# Patient Record
Sex: FEMALE | Race: WHITE | NOT HISPANIC OR LATINO | Employment: FULL TIME | URBAN - METROPOLITAN AREA
[De-identification: names, ages, dates, MRNs, and addresses within clinical notes are randomized per-mention and may not be internally consistent; named-entity substitution may affect disease eponyms.]

---

## 2019-03-19 ENCOUNTER — TELEPHONE (OUTPATIENT)
Dept: SCHEDULING | Facility: IMAGING CENTER | Age: 24
End: 2019-03-19

## 2019-05-03 ENCOUNTER — OFFICE VISIT (OUTPATIENT)
Dept: INTERNAL MEDICINE | Facility: MEDICAL CENTER | Age: 24
End: 2019-05-03
Payer: COMMERCIAL

## 2019-05-03 VITALS
HEIGHT: 64 IN | WEIGHT: 142.6 LBS | SYSTOLIC BLOOD PRESSURE: 98 MMHG | TEMPERATURE: 97.7 F | DIASTOLIC BLOOD PRESSURE: 60 MMHG | BODY MASS INDEX: 24.34 KG/M2 | HEART RATE: 97 BPM | RESPIRATION RATE: 17 BRPM | OXYGEN SATURATION: 96 %

## 2019-05-03 DIAGNOSIS — Z13.0 SCREENING FOR ENDOCRINE, METABOLIC AND IMMUNITY DISORDER: ICD-10-CM

## 2019-05-03 DIAGNOSIS — Z13.228 SCREENING FOR ENDOCRINE, METABOLIC AND IMMUNITY DISORDER: ICD-10-CM

## 2019-05-03 DIAGNOSIS — G43.009 MIGRAINE WITHOUT AURA AND WITHOUT STATUS MIGRAINOSUS, NOT INTRACTABLE: ICD-10-CM

## 2019-05-03 DIAGNOSIS — Z00.00 ROUTINE GENERAL MEDICAL EXAMINATION AT A HEALTH CARE FACILITY: ICD-10-CM

## 2019-05-03 DIAGNOSIS — Z72.51 HIGH RISK HETEROSEXUAL BEHAVIOR: ICD-10-CM

## 2019-05-03 DIAGNOSIS — Z13.29 SCREENING FOR ENDOCRINE, METABOLIC AND IMMUNITY DISORDER: ICD-10-CM

## 2019-05-03 PROCEDURE — 99204 OFFICE O/P NEW MOD 45 MIN: CPT | Mod: GC | Performed by: INTERNAL MEDICINE

## 2019-05-03 ASSESSMENT — PATIENT HEALTH QUESTIONNAIRE - PHQ9: CLINICAL INTERPRETATION OF PHQ2 SCORE: 0

## 2019-05-03 ASSESSMENT — ENCOUNTER SYMPTOMS
MYALGIAS: 0
DIZZINESS: 0
BACK PAIN: 0
DEPRESSION: 0
NERVOUS/ANXIOUS: 0
PALPITATIONS: 0
COUGH: 0
DOUBLE VISION: 0
CHILLS: 0
TINGLING: 0
BRUISES/BLEEDS EASILY: 0
FEVER: 0
BLURRED VISION: 0
SHORTNESS OF BREATH: 0
TREMORS: 0
SENSORY CHANGE: 0

## 2019-05-03 ASSESSMENT — PAIN SCALES - GENERAL: PAINLEVEL: NO PAIN

## 2019-05-03 NOTE — PROGRESS NOTES
Establishment of Care    CC: To establish care   Chief Complaint   Patient presents with   • New Patient     migraine       HPI: Patient is a 24 y.o. female here to establish care.  She has a history of migraine headaches which is being managed by neurology, no other current complaints.    Migraine headaches: Patient has had migraine headaches for years.  She describes her migraines as usually left-sided, sharp in quality, accompanied by blurry vision and black spots in her vision, nausea with vomiting, photosensitivity and sound sensitivity.  She finds relief with laying in a dark room and sleeping.  They can last anywhere from a few hours to a full day.  She is being managed by neurology who has had her on multiple medications with failures, but most recently she was started on Ajovy which she has noted an improvement from occurring about 5 times per month to 2 times monthly.  She is also on sumatriptan for abortive therapy.  Cannot identify any triggers.  No relation to menstrual cycle.  No previous history of head injury, MRIs done in the past have been negative.    ROS:  Review of Systems   Constitutional: Negative for chills and fever.   HENT: Negative for ear pain, hearing loss and tinnitus.    Eyes: Negative for blurred vision and double vision.   Respiratory: Negative for cough and shortness of breath.    Cardiovascular: Negative for chest pain and palpitations.   Genitourinary: Negative for dysuria and urgency.   Musculoskeletal: Negative for back pain and myalgias.   Skin: Negative for itching and rash.   Neurological: Negative for dizziness, tingling, tremors and sensory change.   Endo/Heme/Allergies: Negative for environmental allergies. Does not bruise/bleed easily.   Psychiatric/Behavioral: Negative for depression. The patient is not nervous/anxious.      Patient Active Problem List    Diagnosis Date Noted   • Routine general medical examination at a health care facility  "05/03/2019   • Screening for endocrine, metabolic and immunity disorder 05/03/2019   • Migraine without aura and without status migrainosus, not intractable 05/03/2019   • High risk heterosexual behavior 05/03/2019     Past Medical History:   Diagnosis Date   • OM (otitis media)      Current Outpatient Prescriptions   Medication Sig Dispense Refill   • Fremanezumab-vfrm (AJOVY) 225 MG/1.5ML Solution Prefilled Syringe Inject 225 mg as instructed Once.     • norgestrel-ethinyl estradiol (LO-OVRAL) 0.3-30 MG-MCG TABS Take 1 Tab by mouth every day.         No current facility-administered medications for this visit.      Allergies as of 05/03/2019   • (No Known Allergies)     Social History     Social History   • Marital status: Single     Spouse name: N/A   • Number of children: N/A   • Years of education: N/A     Occupational History   • Not on file.     Social History Main Topics   • Smoking status: Never Smoker   • Smokeless tobacco: Never Used   • Alcohol use No   • Drug use: No   • Sexual activity: Not on file     Other Topics Concern   • Not on file     Social History Narrative   • No narrative on file     Family History   Problem Relation Age of Onset   • Breast Cancer Maternal Grandmother 40   • Breast Cancer Paternal Grandmother 40   • Ovarian Cancer Paternal Aunt 42     Past Surgical History:   Procedure Laterality Date   • TYMPANOPLASTY  1/18/2011    Performed by TAVO MATTHEW at SURGERY SAME DAY [facility] ORS   • MYRINGOTOMY  1997    [facility]     Physical Exam:  BP (!) 98/60 (BP Location: Left arm, Patient Position: Sitting, BP Cuff Size: Adult long)   Pulse 97   Temp 36.5 °C (97.7 °F) (Temporal)   Resp 17   Ht 1.63 m (5' 4.17\")   Wt 64.7 kg (142 lb 9.6 oz)   LMP 04/28/2019 (Approximate)   SpO2 96%   Breastfeeding? No   BMI 24.35 kg/m²   Physical Exam   Constitutional: She is oriented to person, place, and time. She appears well-developed and well-nourished.   HENT:   Head: Normocephalic and " atraumatic.   Mouth/Throat: Oropharynx is clear and moist.   Eyes: Conjunctivae and EOM are normal.   Neck: Normal range of motion.   Cardiovascular: Normal rate, regular rhythm, normal heart sounds and intact distal pulses.  Exam reveals no friction rub.    No murmur heard.  Pulmonary/Chest: Effort normal and breath sounds normal. No respiratory distress. She has no wheezes. She has no rales.   Abdominal: Soft. She exhibits no distension. There is no tenderness.   Musculoskeletal: Normal range of motion. She exhibits no edema.   Lymphadenopathy:     She has no cervical adenopathy.   Neurological: She is alert and oriented to person, place, and time.   Skin: Skin is warm and dry. Capillary refill takes less than 2 seconds.   Psychiatric: She has a normal mood and affect. Her behavior is normal.   Vitals reviewed.    Note: I have reviewed all pertinent labs and diagnostic tests associated with this visit with specific comments listed under the assessment and plan below    Assessment and Plan:    1. Migraine without aura and without status migrainosus, not intractable  -Avoid identifiable triggers   -Continue Anjovy for prevention, sumatriptan for abortive therapy   -Continue follow up with neurology     2. Routine general medical examination at a health care facility  3. Screening for endocrine, metabolic and immunity disorder  -For CBC, CMP, TSH     #Health maintenance:   -Patient would like for STD screening, asymptomatic  -Pap smear done in 2017, negative per patient.  Due 2020 per patient's previously established gynecologist.  -Immunizations reviewed through [employer] up-to-date currently    Followup: Return in about 1 year (around 5/3/2020), or if symptoms worsen or fail to improve.    Signed by: Chitra Espinal M.D.

## 2019-05-17 ENCOUNTER — HOSPITAL ENCOUNTER (OUTPATIENT)
Dept: LAB | Facility: MEDICAL CENTER | Age: 24
End: 2019-05-17
Attending: STUDENT IN AN ORGANIZED HEALTH CARE EDUCATION/TRAINING PROGRAM
Payer: COMMERCIAL

## 2019-05-17 DIAGNOSIS — Z72.51 HIGH RISK HETEROSEXUAL BEHAVIOR: ICD-10-CM

## 2019-05-17 DIAGNOSIS — Z00.00 ROUTINE GENERAL MEDICAL EXAMINATION AT A HEALTH CARE FACILITY: ICD-10-CM

## 2019-05-17 DIAGNOSIS — Z13.228 SCREENING FOR ENDOCRINE, METABOLIC AND IMMUNITY DISORDER: ICD-10-CM

## 2019-05-17 DIAGNOSIS — Z13.0 SCREENING FOR ENDOCRINE, METABOLIC AND IMMUNITY DISORDER: ICD-10-CM

## 2019-05-17 DIAGNOSIS — Z13.29 SCREENING FOR ENDOCRINE, METABOLIC AND IMMUNITY DISORDER: ICD-10-CM

## 2019-05-17 LAB
ALBUMIN SERPL BCP-MCNC: 4.5 G/DL (ref 3.2–4.9)
ALBUMIN/GLOB SERPL: 1.5 G/DL
ALP SERPL-CCNC: 44 U/L (ref 30–99)
ALT SERPL-CCNC: 12 U/L (ref 2–50)
ANION GAP SERPL CALC-SCNC: 6 MMOL/L (ref 0–11.9)
AST SERPL-CCNC: 14 U/L (ref 12–45)
BASOPHILS # BLD AUTO: 0.7 % (ref 0–1.8)
BASOPHILS # BLD: 0.05 K/UL (ref 0–0.12)
BILIRUB SERPL-MCNC: 0.7 MG/DL (ref 0.1–1.5)
BUN SERPL-MCNC: 16 MG/DL (ref 8–22)
CALCIUM SERPL-MCNC: 9.2 MG/DL (ref 8.5–10.5)
CHLORIDE SERPL-SCNC: 107 MMOL/L (ref 96–112)
CO2 SERPL-SCNC: 25 MMOL/L (ref 20–33)
CREAT SERPL-MCNC: 0.63 MG/DL (ref 0.5–1.4)
EOSINOPHIL # BLD AUTO: 0.09 K/UL (ref 0–0.51)
EOSINOPHIL NFR BLD: 1.2 % (ref 0–6.9)
ERYTHROCYTE [DISTWIDTH] IN BLOOD BY AUTOMATED COUNT: 42.7 FL (ref 35.9–50)
GLOBULIN SER CALC-MCNC: 3.1 G/DL (ref 1.9–3.5)
GLUCOSE SERPL-MCNC: 81 MG/DL (ref 65–99)
HCT VFR BLD AUTO: 43.3 % (ref 37–47)
HGB BLD-MCNC: 13.9 G/DL (ref 12–16)
HIV 1+2 AB+HIV1 P24 AG SERPL QL IA: NON REACTIVE
IMM GRANULOCYTES # BLD AUTO: 0.02 K/UL (ref 0–0.11)
IMM GRANULOCYTES NFR BLD AUTO: 0.3 % (ref 0–0.9)
LYMPHOCYTES # BLD AUTO: 2.57 K/UL (ref 1–4.8)
LYMPHOCYTES NFR BLD: 35.3 % (ref 22–41)
MCH RBC QN AUTO: 29.7 PG (ref 27–33)
MCHC RBC AUTO-ENTMCNC: 32.1 G/DL (ref 33.6–35)
MCV RBC AUTO: 92.5 FL (ref 81.4–97.8)
MONOCYTES # BLD AUTO: 0.49 K/UL (ref 0–0.85)
MONOCYTES NFR BLD AUTO: 6.7 % (ref 0–13.4)
NEUTROPHILS # BLD AUTO: 4.06 K/UL (ref 2–7.15)
NEUTROPHILS NFR BLD: 55.8 % (ref 44–72)
NRBC # BLD AUTO: 0 K/UL
NRBC BLD-RTO: 0 /100 WBC
PLATELET # BLD AUTO: 321 K/UL (ref 164–446)
PMV BLD AUTO: 8.7 FL (ref 9–12.9)
POTASSIUM SERPL-SCNC: 3.9 MMOL/L (ref 3.6–5.5)
PROT SERPL-MCNC: 7.6 G/DL (ref 6–8.2)
RBC # BLD AUTO: 4.68 M/UL (ref 4.2–5.4)
SODIUM SERPL-SCNC: 138 MMOL/L (ref 135–145)
TREPONEMA PALLIDUM IGG+IGM AB [PRESENCE] IN SERUM OR PLASMA BY IMMUNOASSAY: NON REACTIVE
TSH SERPL DL<=0.005 MIU/L-ACNC: 1.18 UIU/ML (ref 0.38–5.33)
WBC # BLD AUTO: 7.3 K/UL (ref 4.8–10.8)

## 2019-05-17 PROCEDURE — 87491 CHLMYD TRACH DNA AMP PROBE: CPT

## 2019-05-17 PROCEDURE — 84443 ASSAY THYROID STIM HORMONE: CPT

## 2019-05-17 PROCEDURE — 87389 HIV-1 AG W/HIV-1&-2 AB AG IA: CPT

## 2019-05-17 PROCEDURE — 80053 COMPREHEN METABOLIC PANEL: CPT

## 2019-05-17 PROCEDURE — 86780 TREPONEMA PALLIDUM: CPT

## 2019-05-17 PROCEDURE — 85025 COMPLETE CBC W/AUTO DIFF WBC: CPT

## 2019-05-17 PROCEDURE — 36415 COLL VENOUS BLD VENIPUNCTURE: CPT

## 2019-05-17 PROCEDURE — 87591 N.GONORRHOEAE DNA AMP PROB: CPT

## 2019-05-18 LAB
C TRACH DNA SPEC QL NAA+PROBE: NEGATIVE
N GONORRHOEA DNA SPEC QL NAA+PROBE: NEGATIVE
SPECIMEN SOURCE: NORMAL

## 2022-06-09 ENCOUNTER — HOSPITAL ENCOUNTER (OUTPATIENT)
Dept: LAB | Facility: MEDICAL CENTER | Age: 27
End: 2022-06-09
Attending: OBSTETRICS & GYNECOLOGY
Payer: COMMERCIAL

## 2022-06-09 PROCEDURE — 88175 CYTOPATH C/V AUTO FLUID REDO: CPT

## 2022-06-10 LAB — CYTOLOGY REG CYTOL: NORMAL

## 2023-01-13 ENCOUNTER — HOSPITAL ENCOUNTER (OUTPATIENT)
Dept: LAB | Facility: MEDICAL CENTER | Age: 28
End: 2023-01-13
Attending: OBSTETRICS & GYNECOLOGY
Payer: COMMERCIAL

## 2023-01-13 LAB — B-HCG SERPL-ACNC: <1 MIU/ML (ref 0–5)

## 2023-01-13 PROCEDURE — 36415 COLL VENOUS BLD VENIPUNCTURE: CPT

## 2023-01-13 PROCEDURE — 84702 CHORIONIC GONADOTROPIN TEST: CPT

## 2023-01-24 ENCOUNTER — HOSPITAL ENCOUNTER (OUTPATIENT)
Facility: MEDICAL CENTER | Age: 28
End: 2023-01-24
Attending: PHYSICIAN ASSISTANT
Payer: COMMERCIAL

## 2023-01-24 ENCOUNTER — OFFICE VISIT (OUTPATIENT)
Dept: URGENT CARE | Facility: PHYSICIAN GROUP | Age: 28
End: 2023-01-24
Payer: COMMERCIAL

## 2023-01-24 VITALS
DIASTOLIC BLOOD PRESSURE: 76 MMHG | WEIGHT: 180 LBS | TEMPERATURE: 98.4 F | BODY MASS INDEX: 29.99 KG/M2 | SYSTOLIC BLOOD PRESSURE: 118 MMHG | OXYGEN SATURATION: 98 % | RESPIRATION RATE: 20 BRPM | HEIGHT: 65 IN | HEART RATE: 101 BPM

## 2023-01-24 DIAGNOSIS — J02.9 SORE THROAT: ICD-10-CM

## 2023-01-24 DIAGNOSIS — J06.9 VIRAL URI WITH COUGH: ICD-10-CM

## 2023-01-24 LAB
FLUAV+FLUBV AG SPEC QL IA: NEGATIVE
INT CON NEG: NORMAL
INT CON NEG: NORMAL
INT CON POS: NORMAL
INT CON POS: NORMAL
S PYO AG THROAT QL: NEGATIVE

## 2023-01-24 PROCEDURE — 87804 INFLUENZA ASSAY W/OPTIC: CPT | Performed by: PHYSICIAN ASSISTANT

## 2023-01-24 PROCEDURE — U0005 INFEC AGEN DETEC AMPLI PROBE: HCPCS

## 2023-01-24 PROCEDURE — U0003 INFECTIOUS AGENT DETECTION BY NUCLEIC ACID (DNA OR RNA); SEVERE ACUTE RESPIRATORY SYNDROME CORONAVIRUS 2 (SARS-COV-2) (CORONAVIRUS DISEASE [COVID-19]), AMPLIFIED PROBE TECHNIQUE, MAKING USE OF HIGH THROUGHPUT TECHNOLOGIES AS DESCRIBED BY CMS-2020-01-R: HCPCS

## 2023-01-24 PROCEDURE — 99203 OFFICE O/P NEW LOW 30 MIN: CPT | Performed by: PHYSICIAN ASSISTANT

## 2023-01-24 PROCEDURE — 87880 STREP A ASSAY W/OPTIC: CPT | Performed by: PHYSICIAN ASSISTANT

## 2023-01-24 RX ORDER — GALCANEZUMAB 120 MG/ML
120 INJECTION, SOLUTION SUBCUTANEOUS
Status: ON HOLD | COMMUNITY
Start: 2022-09-20 | End: 2023-05-04

## 2023-01-24 RX ORDER — AMITRIPTYLINE HYDROCHLORIDE 10 MG/1
TABLET, FILM COATED ORAL
Status: ON HOLD | COMMUNITY
Start: 2022-09-20 | End: 2023-05-04

## 2023-01-24 RX ORDER — BENZONATATE 200 MG/1
200 CAPSULE ORAL 3 TIMES DAILY PRN
Qty: 30 CAPSULE | Refills: 0 | Status: ON HOLD | OUTPATIENT
Start: 2023-01-24 | End: 2023-05-04

## 2023-01-24 ASSESSMENT — ENCOUNTER SYMPTOMS
CHILLS: 0
SINUS PAIN: 0
SPUTUM PRODUCTION: 1
ABDOMINAL PAIN: 0
NAUSEA: 0
DIZZINESS: 0
EYE PAIN: 0
BLURRED VISION: 0
HEADACHES: 1
SORE THROAT: 1
FEVER: 0
PALPITATIONS: 0
DIARRHEA: 0
VOMITING: 0
MYALGIAS: 1
SHORTNESS OF BREATH: 0
COUGH: 1

## 2023-01-24 NOTE — PROGRESS NOTES
Subjective     Jitendra Patel is a 28 y.o. female who presents with Cough (Body aches,sore throat,fatigue,today)    HPI:  Jitendra Patel is a 28 y.o. female who presents today for evaluation of URI symptoms.  Patient says she woke up this morning feeling unwell.  She has had sore throat, body aches, fatigue, headache, congestion, and cough.  Says she has actually had a dry cough for about 3 weeks but no other symptoms.  Symptoms are started today and the cough is also worsening, but productive.  She has taken a zinc tablet but no other medications for symptoms.  It did not provide any relief.  She has not had any fever or chills.      Review of Systems   Constitutional:  Positive for malaise/fatigue. Negative for chills and fever.   HENT:  Positive for congestion and sore throat. Negative for ear pain and sinus pain.    Eyes:  Negative for blurred vision and pain.   Respiratory:  Positive for cough and sputum production. Negative for shortness of breath.    Cardiovascular:  Negative for chest pain and palpitations.   Gastrointestinal:  Negative for abdominal pain, diarrhea, nausea and vomiting.   Musculoskeletal:  Positive for myalgias.   Skin:  Negative for rash.   Neurological:  Positive for headaches. Negative for dizziness.         PMH:  has a past medical history of Migraine and OM (otitis media).  MEDS:   Current Outpatient Medications:     amitriptyline (ELAVIL) 10 MG Tab, Take 2 tabs PO QHS, Disp: , Rfl:     benzonatate (TESSALON) 200 MG capsule, Take 1 Capsule by mouth 3 times a day as needed for Cough., Disp: 30 Capsule, Rfl: 0    Fremanezumab-vfrm (AJOVY) 225 MG/1.5ML Solution Prefilled Syringe, Inject 225 mg as instructed Once. (Patient not taking: Reported on 1/24/2023), Disp: , Rfl:     norgestrel-ethinyl estradiol (LO-OVRAL) 0.3-30 MG-MCG TABS, Take 1 Tab by mouth every day.   (Patient not taking: Reported on 1/24/2023), Disp: , Rfl:   ALLERGIES: No Known Allergies  SURGHX:   Past Surgical  "History:   Procedure Laterality Date    TYMPANOPLASTY  1/18/2011    Performed by TAVO MATTHEW at SURGERY SAME DAY [facility] ORS    MYRINGOTOMY  1997    [facility]     SOCHX:  reports that she has never smoked. She has never used smokeless tobacco. She reports that she does not drink alcohol and does not use drugs.  FH: Family history was reviewed, no pertinent findings to report      Objective     /76 (BP Location: Right arm, Patient Position: Sitting, BP Cuff Size: Adult)   Pulse (!) 101   Temp 36.9 °C (98.4 °F) (Temporal)   Resp 20   Ht 1.651 m (5' 5\")   Wt 81.6 kg (180 lb)   SpO2 98%   BMI 29.95 kg/m²      Physical Exam  Constitutional:       Appearance: She is well-developed.   HENT:      Head: Normocephalic and atraumatic.      Right Ear: Tympanic membrane, ear canal and external ear normal.      Left Ear: Tympanic membrane, ear canal and external ear normal.      Nose: Mucosal edema and congestion present. No rhinorrhea.      Mouth/Throat:      Lips: Pink.      Mouth: Mucous membranes are moist.      Pharynx: Uvula midline. Posterior oropharyngeal erythema present. No oropharyngeal exudate or uvula swelling.   Eyes:      Conjunctiva/sclera: Conjunctivae normal.      Pupils: Pupils are equal, round, and reactive to light.   Cardiovascular:      Rate and Rhythm: Normal rate and regular rhythm.      Heart sounds: Normal heart sounds. No murmur heard.  Pulmonary:      Effort: Pulmonary effort is normal.      Breath sounds: Normal breath sounds. No decreased breath sounds, wheezing, rhonchi or rales.   Musculoskeletal:      Cervical back: Normal range of motion.   Lymphadenopathy:      Cervical: No cervical adenopathy.   Skin:     General: Skin is warm and dry.      Capillary Refill: Capillary refill takes less than 2 seconds.   Neurological:      Mental Status: She is alert and oriented to person, place, and time.   Psychiatric:         Behavior: Behavior normal.         Judgment: Judgment normal. "             POCT Rapid Strep A - Negative    POCT Influenza A/B - Negative    Assessment & Plan     1. Sore throat  - POCT Rapid Strep A  - POCT Influenza A/B  - SARS-CoV-2, PCR (In-House); Future  -Supportive care discussed to include salt water gargles, throat lozenges, and increased fluid intake    2. Viral URI with cough  - benzonatate (TESSALON) 200 MG capsule; Take 1 Capsule by mouth 3 times a day as needed for Cough.  Dispense: 30 Capsule; Refill: 0  - POCT Influenza A/B  - SARS-CoV-2, PCR (In-House); Future  - OTC cold/flu medications  -Supportive care also discussed to include the use of saline nasal rinses, steam inhalation, and the use of a cool-mist humidifier in the bedroom at night.  - PO fluids  - Rest  - Tylenol or ibuprofen as needed for fever > 100.4 F              Differential Diagnosis, natural history, and supportive care discussed. Return to the Urgent Care or follow up with your PCP if symptoms fail to resolve, or for any new or worsening symptoms. Emergency room precautions discussed. Patient and/or family appears understanding of information.

## 2023-01-25 DIAGNOSIS — J06.9 VIRAL URI WITH COUGH: ICD-10-CM

## 2023-01-25 DIAGNOSIS — J02.9 SORE THROAT: ICD-10-CM

## 2023-01-25 LAB
SARS-COV-2 RNA RESP QL NAA+PROBE: NOTDETECTED
SPECIMEN SOURCE: NORMAL

## 2023-01-26 ENCOUNTER — OFFICE VISIT (OUTPATIENT)
Dept: URGENT CARE | Facility: PHYSICIAN GROUP | Age: 28
End: 2023-01-26
Payer: COMMERCIAL

## 2023-01-26 VITALS
HEIGHT: 65 IN | WEIGHT: 180 LBS | SYSTOLIC BLOOD PRESSURE: 122 MMHG | HEART RATE: 120 BPM | RESPIRATION RATE: 18 BRPM | DIASTOLIC BLOOD PRESSURE: 80 MMHG | BODY MASS INDEX: 29.99 KG/M2 | TEMPERATURE: 98.9 F | OXYGEN SATURATION: 97 %

## 2023-01-26 DIAGNOSIS — J02.9 SORE THROAT: ICD-10-CM

## 2023-01-26 DIAGNOSIS — R05.1 ACUTE COUGH: ICD-10-CM

## 2023-01-26 DIAGNOSIS — H66.001 NON-RECURRENT ACUTE SUPPURATIVE OTITIS MEDIA OF RIGHT EAR WITHOUT SPONTANEOUS RUPTURE OF TYMPANIC MEMBRANE: ICD-10-CM

## 2023-01-26 LAB
EXTERNAL QUALITY CONTROL: NORMAL
INT CON NEG: NORMAL
INT CON NEG: NORMAL
INT CON POS: NORMAL
INT CON POS: NORMAL
S PYO AG THROAT QL: NEGATIVE
SARS-COV+SARS-COV-2 AG RESP QL IA.RAPID: NEGATIVE

## 2023-01-26 PROCEDURE — 99213 OFFICE O/P EST LOW 20 MIN: CPT | Performed by: PHYSICIAN ASSISTANT

## 2023-01-26 PROCEDURE — 87880 STREP A ASSAY W/OPTIC: CPT | Performed by: PHYSICIAN ASSISTANT

## 2023-01-26 PROCEDURE — 87426 SARSCOV CORONAVIRUS AG IA: CPT | Performed by: PHYSICIAN ASSISTANT

## 2023-01-26 RX ORDER — AMOXICILLIN 875 MG/1
875 TABLET, COATED ORAL 2 TIMES DAILY
Qty: 14 TABLET | Refills: 0 | Status: SHIPPED | OUTPATIENT
Start: 2023-01-26 | End: 2023-02-02

## 2023-01-26 ASSESSMENT — ENCOUNTER SYMPTOMS
MYALGIAS: 1
SORE THROAT: 1
COUGH: 1
FEVER: 0
CHILLS: 1

## 2023-01-26 NOTE — PROGRESS NOTES
Subjective:   Jitendra Patel is a 28 y.o. female who presents today with   Chief Complaint   Patient presents with    Sore Throat     Puss pockets, chills, symptoms getting worst      Pharyngitis   This is a new problem. Episode onset: 3 days. The problem has been gradually worsening. There has been no fever. The pain is mild. Associated symptoms include coughing. Associated symptoms comments: Chills. She has tried nothing for the symptoms. The treatment provided no relief.   Patient was seen 2 days ago and tested for flu, COVID and strep that all came back negative at that time.  She states her symptoms are getting worse.    PMH:  has a past medical history of Migraine and OM (otitis media).  MEDS:   Current Outpatient Medications:     amoxicillin (AMOXIL) 875 MG tablet, Take 1 Tablet by mouth 2 times a day for 7 days., Disp: 14 Tablet, Rfl: 0    amitriptyline (ELAVIL) 10 MG Tab, Take 2 tabs PO QHS, Disp: , Rfl:     benzonatate (TESSALON) 200 MG capsule, Take 1 Capsule by mouth 3 times a day as needed for Cough., Disp: 30 Capsule, Rfl: 0    Galcanezumab-gnlm (EMGALITY) 120 MG/ML Solution Auto-injector, Inject 120 mg under the skin., Disp: , Rfl:   ALLERGIES: No Known Allergies  SURGHX:   Past Surgical History:   Procedure Laterality Date    TYMPANOPLASTY  1/18/2011    Performed by TAVO MATTHEW at SURGERY SAME DAY [facility]    MYRINGOTOMY  1997    [facility]     SOCHX:  reports that she has never smoked. She has never used smokeless tobacco. She reports that she does not drink alcohol and does not use drugs.  FH: Reviewed with patient, not pertinent to this visit.     Review of Systems   Constitutional:  Positive for chills. Negative for fever.   HENT:  Positive for sore throat.    Respiratory:  Positive for cough.    Musculoskeletal:  Positive for myalgias.      Objective:   /80 (BP Location: Right arm, Patient Position: Sitting, BP Cuff Size: Adult)   Pulse (!) 120   Temp 37.2 °C (98.9 °F)  "(Temporal)   Resp 18   Ht 1.651 m (5' 5\")   Wt 81.6 kg (180 lb)   SpO2 97%   BMI 29.95 kg/m²   Physical Exam  Vitals and nursing note reviewed.   Constitutional:       General: She is not in acute distress.     Appearance: Normal appearance. She is well-developed. She is not ill-appearing or toxic-appearing.   HENT:      Head: Normocephalic and atraumatic.      Right Ear: Hearing and ear canal normal. A middle ear effusion is present. Tympanic membrane is erythematous. Tympanic membrane is not bulging.      Left Ear: Hearing, tympanic membrane and ear canal normal.      Mouth/Throat:      Mouth: Mucous membranes are moist.      Pharynx: Uvula midline. Posterior oropharyngeal erythema present. No oropharyngeal exudate or uvula swelling.      Tonsils: No tonsillar exudate or tonsillar abscesses. 1+ on the right. 1+ on the left.      Comments: Left-sided tonsillar stone.  Cardiovascular:      Rate and Rhythm: Normal rate and regular rhythm.      Heart sounds: Normal heart sounds.   Pulmonary:      Effort: Pulmonary effort is normal.      Breath sounds: Normal breath sounds. No stridor. No wheezing, rhonchi or rales.   Musculoskeletal:      Comments: Normal movement in all 4 extremities   Skin:     General: Skin is warm and dry.   Neurological:      Mental Status: She is alert.      Coordination: Coordination normal.   Psychiatric:         Mood and Affect: Mood normal.     STREP A -  COVID -    Assessment/Plan:   Assessment    1. Non-recurrent acute suppurative otitis media of right ear without spontaneous rupture of tympanic membrane  - amoxicillin (AMOXIL) 875 MG tablet; Take 1 Tablet by mouth 2 times a day for 7 days.  Dispense: 14 Tablet; Refill: 0    2. Sore throat  - POCT Rapid Strep A    3. Acute cough  - POCT SARS-COV Antigen GABRIEL (Symptomatic only)    Patient's right ear does appear to be consistent with right-sided ear infection at this time and we will treat accordingly with antibiotics.  The white spot " that the patient appreciated to her left tonsil appears consistent with tonsillar stone at this time and patient will use over-the-counter warm salt water gargles.      Differential diagnosis, natural history, supportive care, and indications for immediate follow-up discussed.   Patient given instructions and understanding of medications and treatment.    If not improving in 3-5 days, F/U with PCP or return to UC if symptoms worsen.    Patient agreeable to plan.      Please note that this dictation was created using voice recognition software. I have made every reasonable attempt to correct obvious errors, but I expect that there are errors of grammar and possibly content that I did not discover before finalizing the note.    Tree Lagos PA-C

## 2023-02-21 ENCOUNTER — HOSPITAL ENCOUNTER (OUTPATIENT)
Facility: MEDICAL CENTER | Age: 28
End: 2023-02-21
Attending: OBSTETRICS & GYNECOLOGY
Payer: COMMERCIAL

## 2023-02-21 LAB — B-HCG SERPL-ACNC: ABNORMAL MIU/ML (ref 0–5)

## 2023-02-21 PROCEDURE — 84702 CHORIONIC GONADOTROPIN TEST: CPT

## 2023-03-16 ENCOUNTER — HOSPITAL ENCOUNTER (OUTPATIENT)
Facility: MEDICAL CENTER | Age: 28
End: 2023-03-16
Attending: OBSTETRICS & GYNECOLOGY
Payer: COMMERCIAL

## 2023-03-16 ENCOUNTER — HOSPITAL ENCOUNTER (OUTPATIENT)
Dept: LAB | Facility: MEDICAL CENTER | Age: 28
End: 2023-03-16
Attending: OBSTETRICS & GYNECOLOGY

## 2023-03-16 LAB
ABO GROUP BLD: NORMAL
BASOPHILS # BLD AUTO: 0.5 % (ref 0–1.8)
BASOPHILS # BLD: 0.04 K/UL (ref 0–0.12)
BLD GP AB SCN SERPL QL: NORMAL
EOSINOPHIL # BLD AUTO: 0.11 K/UL (ref 0–0.51)
EOSINOPHIL NFR BLD: 1.4 % (ref 0–6.9)
ERYTHROCYTE [DISTWIDTH] IN BLOOD BY AUTOMATED COUNT: 44.4 FL (ref 35.9–50)
HBV SURFACE AG SER QL: NORMAL
HCT VFR BLD AUTO: 40.5 % (ref 37–47)
HCV AB SER QL: NORMAL
HGB BLD-MCNC: 13.1 G/DL (ref 12–16)
HIV 1+2 AB+HIV1 P24 AG SERPL QL IA: NORMAL
IMM GRANULOCYTES # BLD AUTO: 0.04 K/UL (ref 0–0.11)
IMM GRANULOCYTES NFR BLD AUTO: 0.5 % (ref 0–0.9)
LYMPHOCYTES # BLD AUTO: 2.31 K/UL (ref 1–4.8)
LYMPHOCYTES NFR BLD: 28.9 % (ref 22–41)
MCH RBC QN AUTO: 29.4 PG (ref 27–33)
MCHC RBC AUTO-ENTMCNC: 32.3 G/DL (ref 33.6–35)
MCV RBC AUTO: 91 FL (ref 81.4–97.8)
MONOCYTES # BLD AUTO: 0.46 K/UL (ref 0–0.85)
MONOCYTES NFR BLD AUTO: 5.8 % (ref 0–13.4)
NEUTROPHILS # BLD AUTO: 5.04 K/UL (ref 2–7.15)
NEUTROPHILS NFR BLD: 62.9 % (ref 44–72)
NRBC # BLD AUTO: 0 K/UL
NRBC BLD-RTO: 0 /100 WBC
PLATELET # BLD AUTO: 350 K/UL (ref 164–446)
PMV BLD AUTO: 8.8 FL (ref 9–12.9)
RBC # BLD AUTO: 4.45 M/UL (ref 4.2–5.4)
RH BLD: NORMAL
RUBV AB SER QL: 64.2 IU/ML
T PALLIDUM AB SER QL IA: NORMAL
WBC # BLD AUTO: 8 K/UL (ref 4.8–10.8)

## 2023-03-16 PROCEDURE — 88175 CYTOPATH C/V AUTO FLUID REDO: CPT

## 2023-03-16 PROCEDURE — 86780 TREPONEMA PALLIDUM: CPT

## 2023-03-16 PROCEDURE — 86762 RUBELLA ANTIBODY: CPT

## 2023-03-16 PROCEDURE — 85025 COMPLETE CBC W/AUTO DIFF WBC: CPT

## 2023-03-16 PROCEDURE — 87491 CHLMYD TRACH DNA AMP PROBE: CPT

## 2023-03-16 PROCEDURE — 87389 HIV-1 AG W/HIV-1&-2 AB AG IA: CPT

## 2023-03-16 PROCEDURE — 86901 BLOOD TYPING SEROLOGIC RH(D): CPT

## 2023-03-16 PROCEDURE — 87591 N.GONORRHOEAE DNA AMP PROB: CPT

## 2023-03-16 PROCEDURE — 87340 HEPATITIS B SURFACE AG IA: CPT

## 2023-03-16 PROCEDURE — 86803 HEPATITIS C AB TEST: CPT

## 2023-03-16 PROCEDURE — 86850 RBC ANTIBODY SCREEN: CPT

## 2023-03-16 PROCEDURE — 87086 URINE CULTURE/COLONY COUNT: CPT

## 2023-03-16 PROCEDURE — 36415 COLL VENOUS BLD VENIPUNCTURE: CPT

## 2023-03-16 PROCEDURE — 86900 BLOOD TYPING SEROLOGIC ABO: CPT

## 2023-03-17 LAB
C TRACH DNA GENITAL QL NAA+PROBE: NEGATIVE
CYTOLOGY REG CYTOL: NORMAL
N GONORRHOEA DNA GENITAL QL NAA+PROBE: NEGATIVE
SPECIMEN SOURCE: NORMAL

## 2023-03-18 LAB
BACTERIA UR CULT: NORMAL
SIGNIFICANT IND 70042: NORMAL
SITE SITE: NORMAL
SOURCE SOURCE: NORMAL

## 2023-04-24 ENCOUNTER — HOSPITAL ENCOUNTER (EMERGENCY)
Facility: MEDICAL CENTER | Age: 28
End: 2023-04-24
Attending: EMERGENCY MEDICINE
Payer: COMMERCIAL

## 2023-04-24 VITALS
SYSTOLIC BLOOD PRESSURE: 111 MMHG | RESPIRATION RATE: 16 BRPM | WEIGHT: 196.87 LBS | BODY MASS INDEX: 33.61 KG/M2 | OXYGEN SATURATION: 100 % | HEIGHT: 64 IN | DIASTOLIC BLOOD PRESSURE: 67 MMHG | TEMPERATURE: 98.8 F | HEART RATE: 95 BPM

## 2023-04-24 DIAGNOSIS — G43.809 OTHER MIGRAINE WITHOUT STATUS MIGRAINOSUS, NOT INTRACTABLE: ICD-10-CM

## 2023-04-24 DIAGNOSIS — R11.2 NAUSEA AND VOMITING, UNSPECIFIED VOMITING TYPE: ICD-10-CM

## 2023-04-24 PROCEDURE — A9270 NON-COVERED ITEM OR SERVICE: HCPCS | Performed by: EMERGENCY MEDICINE

## 2023-04-24 PROCEDURE — 99284 EMERGENCY DEPT VISIT MOD MDM: CPT

## 2023-04-24 PROCEDURE — 700102 HCHG RX REV CODE 250 W/ 637 OVERRIDE(OP): Performed by: EMERGENCY MEDICINE

## 2023-04-24 PROCEDURE — 700111 HCHG RX REV CODE 636 W/ 250 OVERRIDE (IP): Performed by: EMERGENCY MEDICINE

## 2023-04-24 PROCEDURE — 700105 HCHG RX REV CODE 258: Performed by: EMERGENCY MEDICINE

## 2023-04-24 PROCEDURE — 96374 THER/PROPH/DIAG INJ IV PUSH: CPT

## 2023-04-24 PROCEDURE — 96375 TX/PRO/DX INJ NEW DRUG ADDON: CPT

## 2023-04-24 RX ORDER — SODIUM CHLORIDE 9 MG/ML
1000 INJECTION, SOLUTION INTRAVENOUS ONCE
Status: COMPLETED | OUTPATIENT
Start: 2023-04-24 | End: 2023-04-24

## 2023-04-24 RX ORDER — METOCLOPRAMIDE HYDROCHLORIDE 5 MG/ML
10 INJECTION INTRAMUSCULAR; INTRAVENOUS ONCE
Status: COMPLETED | OUTPATIENT
Start: 2023-04-24 | End: 2023-04-24

## 2023-04-24 RX ORDER — ACETAMINOPHEN 500 MG
1000 TABLET ORAL ONCE
Status: COMPLETED | OUTPATIENT
Start: 2023-04-24 | End: 2023-04-24

## 2023-04-24 RX ORDER — METOCLOPRAMIDE 10 MG/1
10 TABLET ORAL 4 TIMES DAILY PRN
Qty: 60 TABLET | Refills: 0 | Status: SHIPPED | OUTPATIENT
Start: 2023-04-24

## 2023-04-24 RX ORDER — DIPHENHYDRAMINE HYDROCHLORIDE 50 MG/ML
50 INJECTION INTRAMUSCULAR; INTRAVENOUS ONCE
Status: COMPLETED | OUTPATIENT
Start: 2023-04-24 | End: 2023-04-24

## 2023-04-24 RX ADMIN — SODIUM CHLORIDE 1000 ML: 9 INJECTION, SOLUTION INTRAVENOUS at 07:31

## 2023-04-24 RX ADMIN — DIPHENHYDRAMINE HYDROCHLORIDE 50 MG: 50 INJECTION INTRAMUSCULAR; INTRAVENOUS at 07:28

## 2023-04-24 RX ADMIN — ACETAMINOPHEN 1000 MG: 500 TABLET, FILM COATED ORAL at 07:28

## 2023-04-24 RX ADMIN — METOCLOPRAMIDE 10 MG: 5 INJECTION, SOLUTION INTRAMUSCULAR; INTRAVENOUS at 07:27

## 2023-04-24 ASSESSMENT — PAIN DESCRIPTION - PAIN TYPE: TYPE: ACUTE PAIN

## 2023-04-24 NOTE — ED NOTES
Pt AOx4 and ready for education. All discharge instructions given to pt as well as Rx for Reglan. Pt verbalized understanding of all discharge instructions. All lines removed prior to discharge. All questions answered. Pt to lobby via ambulation.

## 2023-04-24 NOTE — ED TRIAGE NOTES
"Jitendra Patel  Chief Complaint   Patient presents with    Headache     Hx of migraines. HA since 1800 last night. +n/v, light sensitivity.   Pt is 14 weeks pregnant.        Pt placed in lobby and educated on triage process. Pt encouraged to alert staff for any changes.     Patient and staff wearing appropriate PPE    /82   Pulse 93   Temp 36.3 °C (97.3 °F) (Temporal)   Resp 18   Ht 1.626 m (5' 4\")   Wt 89.3 kg (196 lb 13.9 oz)   SpO2 99% Comment: [place] ir  BMI 33.79 kg/m²     "

## 2023-04-24 NOTE — ED PROVIDER NOTES
ED Provider Note    Scribed for Krys Rockwell M.D. by Sebastien Hsieh. 4/24/2023, 7:04 AM.    Primary care provider: Chitra Espinal M.D.  Means of arrival: Walk-in  History obtained from: Patient  History limited by: None    CHIEF COMPLAINT  Chief Complaint   Patient presents with    Headache     Hx of migraines. HA since 1800 last night. +n/v, light sensitivity.   Pt is 14 weeks pregnant.       HPI/ROS  Jitendra Jocelyn Patel is a 28 y.o. female with a history of migraines who presents to the Emergency Department for acute headache. Patient states her headache started around 12 hours ago and progressively worsening. She was not able to take medication for headache at home due to nausea and vomiting. She also has associated light sensitivity. Patient is 14 weeks pregnant.  Reports that the pregnancy has been uncomplicated.  Last ultrasound was a couple weeks ago and was unremarkable.  At this time she denies any abdominal pain or discomfort.  She has not had any nausea or vomiting throughout the pregnancy.    EXTERNAL RECORDS REVIEWED  None pertinent    LIMITATION TO HISTORY   Select: : None    OUTSIDE HISTORIAN(S):  None      PAST MEDICAL HISTORY   has a past medical history of Migraine and OM (otitis media).    SURGICAL HISTORY   has a past surgical history that includes myringotomy (1997) and tympanoplasty (1/18/2011).    SOCIAL HISTORY  Social History     Tobacco Use    Smoking status: Never    Smokeless tobacco: Never   Substance Use Topics    Alcohol use: No    Drug use: No      Social History     Substance and Sexual Activity   Drug Use No       FAMILY HISTORY  Family History   Problem Relation Age of Onset    Breast Cancer Maternal Grandmother 40    Breast Cancer Paternal Grandmother 40    Ovarian Cancer Paternal Aunt 42       CURRENT MEDICATIONS  Home Medications    **Home medications have not yet been reviewed for this encounter**         ALLERGIES  No Known Allergies    PHYSICAL EXAM  VITAL SIGNS: BP  "124/82   Pulse 93   Temp 36.3 °C (97.3 °F) (Temporal)   Resp 18   Ht 1.626 m (5' 4\")   Wt 89.3 kg (196 lb 13.9 oz)   SpO2 99% Comment: Room air  BMI 33.79 kg/m²   Vitals reviewed by myself.  Physical Exam  Nursing note and vitals reviewed.  Constitutional: Well-developed and well-nourished. No distress.   HENT: Head is normocephalic and atraumatic. Oropharynx is clear and moist without exudate or erythema.   Eyes: Pupils are equal, round, and reactive to light. No horizontal or vertical nystagmus. Conjunctiva are normal.   Cardiovascular: Normal rate and regular rhythm. No murmur heard. Normal radial pulses.  Pulmonary/Chest: Breath sounds normal. No wheezes or rales.   Abdominal: Soft and non-tender. No distention.    Musculoskeletal: Extremities exhibit normal range of motion without edema or tenderness. Patient ambulates with a normal narrow-based steady gait.   Neurological: Awake, alert and oriented to person, place, and time. No focal deficits noted. Cranial nerves II - XII intact. No pronator drift.  No dysmetria on cerebellar testing. Normal speech and language. Normal strength and sensation in bilateral upper and lower extremities.   Skin: Skin is warm and dry. No rash.   Psychiatric: Normal mood and affect. Appropriate for clinical situation.      DIAGNOSTIC STUDIES:  LABS  None    COURSE & MEDICAL DECISION MAKING    ED Observation Status? Yes; I am placing the patient in to an observation status due to a diagnostic uncertainty as well as therapeutic intensity. Patient placed in observation status at 7:10 AM, 4/24/2023.     Observation plan is as follows: Follow-up response to treatment    Upon Reevaluation, the patient's condition has: Improved; and will be discharged.    Patient discharged from ED Observation status at 8:31 AM 04/24/23.    INITIAL ASSESSMENT AND PLAN    Patient is a 28-year-old female who comes in for evaluation of headache in pregnancy.  Differential diagnosis includes migraine " headache, tension headache.  Patient is early in her pregnancy and therefore this is unlikely preeclampsia type symptoms.  Her blood pressure is within normal limits ruling out gestational hypertension.  She is neurologically intact on exam and has a history of migraine headaches, therefore I do not believe imaging of the head or labs are indicated.  I will treat her symptoms, she is amenable to this plan.    HYDRATION: Based on the patient's presentation of Acute Vomiting the patient was given IV fluids. IV Hydration was used because oral hydration was not adequate alone. Upon recheck following hydration, the patient was improved.       REASSESSMENTS   8:32 AM Patient reassessed at bedside; her headache has significantly improved after IVF, Tylenol, Benadryl, and Reglan. She has no new medical complaints. Discussed discharge instructions and return precautions with the patient and they were cleared for discharge. Patient was given the opportunity to ask any further questions. Patient is comfortable with discharge at this time.       ER COURSE AND FINAL DISPOSITION   Patient's initial vitals are within normal limits, as noted above she is neurologically intact on exam.  Patient is treated with IV fluids, Reglan and Benadryl.  After treatment her nausea has resolved and she is further treated with Tylenol for her headache.  Upon reassessment patient is feeling greatly improved, her headache has resolved and she is able to tolerate oral intake without difficulty.  She remains neurologically intact with vitals in normal limits.  Therefore she is reassured and advised on ongoing management of migraine headaches.  She is provided with prescription for Reglan and given strict return precautions.  Patient is then discharged in stable condition.    ADDITIONAL PROBLEM LIST AND RESOURCE UTILIZATION    Additional problems aside from the chief complaint that I have addressed: None    I have discussed management of the patient  with the following physicians and LISSA's: None    Discussion of management with other [facility] or appropriate source(s): None     Escalation of care considered, and ultimately not performed: blood analysis and diagnostic imaging.     Barriers to care at this time, including but not limited to: None.     Decision tools and prescription drugs considered including, but not limited to: Reglan.    Please see review of records as noted above    The patient will return for new or worsening symptoms and is stable at the time of discharge.    The patient is referred to a primary physician for blood pressure management, diabetic screening, and for all other preventative health concerns.    DISPOSITION:  Patient will be discharged home in stable condition.    FOLLOW UP:  Chitra Espinal M.D.  [id number] [facility] [id number] [phone]            OUTPATIENT MEDICATIONS:  New Prescriptions    METOCLOPRAMIDE (REGLAN) 10 MG TAB    Take 1 Tablet by mouth 4 times a day as needed (headdache/nausea).       FINAL IMPRESSION  1. Other migraine without status migrainosus, not intractable    2. Nausea and vomiting, unspecified vomiting type          I, Sebastien Hsieh (Raúl), am scribing for, and in the presence of, Krys Rockwell M.D..    Electronically signed by: Sebastien Hsieh (Raúl), 4/24/2023    IKrys M.D. personally performed the services described in this documentation, as scribed by Sebastien Hsieh in my presence, and it is both accurate and complete.    The note accurately reflects work and decisions made by me.  Krys Rockwell M.D.  4/24/2023  9:32 AM

## 2023-05-04 ENCOUNTER — HOSPITAL ENCOUNTER (INPATIENT)
Facility: MEDICAL CENTER | Age: 28
LOS: 1 days | DRG: 770 | End: 2023-05-05
Attending: OBSTETRICS & GYNECOLOGY | Admitting: OBSTETRICS & GYNECOLOGY
Payer: COMMERCIAL

## 2023-05-04 ENCOUNTER — APPOINTMENT (OUTPATIENT)
Dept: OBGYN | Facility: MEDICAL CENTER | Age: 28
DRG: 770 | End: 2023-05-04
Attending: OBSTETRICS & GYNECOLOGY
Payer: COMMERCIAL

## 2023-05-04 ENCOUNTER — ANESTHESIA (OUTPATIENT)
Dept: ANESTHESIOLOGY | Facility: MEDICAL CENTER | Age: 28
DRG: 770 | End: 2023-05-04
Payer: COMMERCIAL

## 2023-05-04 LAB
ABO GROUP BLD: NORMAL
BASOPHILS # BLD AUTO: 0.3 % (ref 0–1.8)
BASOPHILS # BLD: 0.04 K/UL (ref 0–0.12)
BLD GP AB SCN SERPL QL: NORMAL
EOSINOPHIL # BLD AUTO: 0.05 K/UL (ref 0–0.51)
EOSINOPHIL NFR BLD: 0.4 % (ref 0–6.9)
ERYTHROCYTE [DISTWIDTH] IN BLOOD BY AUTOMATED COUNT: 42.4 FL (ref 35.9–50)
HCT VFR BLD AUTO: 37.2 % (ref 37–47)
HGB BLD-MCNC: 12.7 G/DL (ref 12–16)
IMM GRANULOCYTES # BLD AUTO: 0.05 K/UL (ref 0–0.11)
IMM GRANULOCYTES NFR BLD AUTO: 0.4 % (ref 0–0.9)
LYMPHOCYTES # BLD AUTO: 1.99 K/UL (ref 1–4.8)
LYMPHOCYTES NFR BLD: 16.3 % (ref 22–41)
MCH RBC QN AUTO: 30.2 PG (ref 27–33)
MCHC RBC AUTO-ENTMCNC: 34.1 G/DL (ref 33.6–35)
MCV RBC AUTO: 88.6 FL (ref 81.4–97.8)
MONOCYTES # BLD AUTO: 0.6 K/UL (ref 0–0.85)
MONOCYTES NFR BLD AUTO: 4.9 % (ref 0–13.4)
NEUTROPHILS # BLD AUTO: 9.51 K/UL (ref 2–7.15)
NEUTROPHILS NFR BLD: 77.7 % (ref 44–72)
NRBC # BLD AUTO: 0 K/UL
NRBC BLD-RTO: 0 /100 WBC
PLATELET # BLD AUTO: 321 K/UL (ref 164–446)
PMV BLD AUTO: 8.8 FL (ref 9–12.9)
RBC # BLD AUTO: 4.2 M/UL (ref 4.2–5.4)
RH BLD: NORMAL
T PALLIDUM AB SER QL IA: NORMAL
WBC # BLD AUTO: 12.2 K/UL (ref 4.8–10.8)

## 2023-05-04 PROCEDURE — 86900 BLOOD TYPING SEROLOGIC ABO: CPT

## 2023-05-04 PROCEDURE — 700111 HCHG RX REV CODE 636 W/ 250 OVERRIDE (IP): Performed by: OBSTETRICS & GYNECOLOGY

## 2023-05-04 PROCEDURE — 700101 HCHG RX REV CODE 250: Performed by: ANESTHESIOLOGY

## 2023-05-04 PROCEDURE — 700111 HCHG RX REV CODE 636 W/ 250 OVERRIDE (IP): Performed by: ANESTHESIOLOGY

## 2023-05-04 PROCEDURE — 700102 HCHG RX REV CODE 250 W/ 637 OVERRIDE(OP): Performed by: OBSTETRICS & GYNECOLOGY

## 2023-05-04 PROCEDURE — 770002 HCHG ROOM/CARE - OB PRIVATE (112)

## 2023-05-04 PROCEDURE — 36415 COLL VENOUS BLD VENIPUNCTURE: CPT

## 2023-05-04 PROCEDURE — 86850 RBC ANTIBODY SCREEN: CPT

## 2023-05-04 PROCEDURE — 3E0P7VZ INTRODUCTION OF HORMONE INTO FEMALE REPRODUCTIVE, VIA NATURAL OR ARTIFICIAL OPENING: ICD-10-PCS | Performed by: OBSTETRICS & GYNECOLOGY

## 2023-05-04 PROCEDURE — 85025 COMPLETE CBC W/AUTO DIFF WBC: CPT

## 2023-05-04 PROCEDURE — 700102 HCHG RX REV CODE 250 W/ 637 OVERRIDE(OP)

## 2023-05-04 PROCEDURE — 700105 HCHG RX REV CODE 258: Performed by: OBSTETRICS & GYNECOLOGY

## 2023-05-04 PROCEDURE — A9270 NON-COVERED ITEM OR SERVICE: HCPCS | Performed by: OBSTETRICS & GYNECOLOGY

## 2023-05-04 PROCEDURE — 86901 BLOOD TYPING SEROLOGIC RH(D): CPT

## 2023-05-04 PROCEDURE — A9270 NON-COVERED ITEM OR SERVICE: HCPCS

## 2023-05-04 PROCEDURE — 01967 NEURAXL LBR ANES VAG DLVR: CPT | Performed by: ANESTHESIOLOGY

## 2023-05-04 PROCEDURE — 700111 HCHG RX REV CODE 636 W/ 250 OVERRIDE (IP)

## 2023-05-04 PROCEDURE — 96365 THER/PROPH/DIAG IV INF INIT: CPT

## 2023-05-04 PROCEDURE — 10D17ZZ EXTRACTION OF PRODUCTS OF CONCEPTION, RETAINED, VIA NATURAL OR ARTIFICIAL OPENING: ICD-10-PCS | Performed by: OBSTETRICS & GYNECOLOGY

## 2023-05-04 PROCEDURE — 86780 TREPONEMA PALLIDUM: CPT

## 2023-05-04 RX ORDER — SODIUM CHLORIDE, SODIUM LACTATE, POTASSIUM CHLORIDE, AND CALCIUM CHLORIDE .6; .31; .03; .02 G/100ML; G/100ML; G/100ML; G/100ML
250 INJECTION, SOLUTION INTRAVENOUS PRN
Status: DISCONTINUED | OUTPATIENT
Start: 2023-05-04 | End: 2023-05-05 | Stop reason: HOSPADM

## 2023-05-04 RX ORDER — ROPIVACAINE HYDROCHLORIDE 2 MG/ML
INJECTION, SOLUTION EPIDURAL; INFILTRATION; PERINEURAL CONTINUOUS
Status: DISCONTINUED | OUTPATIENT
Start: 2023-05-04 | End: 2023-05-05 | Stop reason: HOSPADM

## 2023-05-04 RX ORDER — LIDOCAINE HYDROCHLORIDE 10 MG/ML
20 INJECTION, SOLUTION INFILTRATION; PERINEURAL
Status: DISCONTINUED | OUTPATIENT
Start: 2023-05-04 | End: 2023-05-05 | Stop reason: HOSPADM

## 2023-05-04 RX ORDER — DEXTROSE, SODIUM CHLORIDE, SODIUM LACTATE, POTASSIUM CHLORIDE, AND CALCIUM CHLORIDE 5; .6; .31; .03; .02 G/100ML; G/100ML; G/100ML; G/100ML; G/100ML
INJECTION, SOLUTION INTRAVENOUS CONTINUOUS
Status: DISCONTINUED | OUTPATIENT
Start: 2023-05-04 | End: 2023-05-05 | Stop reason: HOSPADM

## 2023-05-04 RX ORDER — EPHEDRINE SULFATE 50 MG/ML
5 INJECTION, SOLUTION INTRAVENOUS
Status: DISCONTINUED | OUTPATIENT
Start: 2023-05-04 | End: 2023-05-05 | Stop reason: HOSPADM

## 2023-05-04 RX ORDER — SODIUM CHLORIDE, SODIUM LACTATE, POTASSIUM CHLORIDE, CALCIUM CHLORIDE 600; 310; 30; 20 MG/100ML; MG/100ML; MG/100ML; MG/100ML
1000 INJECTION, SOLUTION INTRAVENOUS CONTINUOUS
Status: ACTIVE | OUTPATIENT
Start: 2023-05-04 | End: 2023-05-04

## 2023-05-04 RX ORDER — ROPIVACAINE HYDROCHLORIDE 2 MG/ML
INJECTION, SOLUTION EPIDURAL; INFILTRATION; PERINEURAL
Status: COMPLETED
Start: 2023-05-04 | End: 2023-05-04

## 2023-05-04 RX ORDER — MISOPROSTOL 200 UG/1
200 TABLET ORAL EVERY 6 HOURS
Status: DISCONTINUED | OUTPATIENT
Start: 2023-05-04 | End: 2023-05-04

## 2023-05-04 RX ORDER — ACETAMINOPHEN 500 MG
1000 TABLET ORAL
Status: COMPLETED | OUTPATIENT
Start: 2023-05-04 | End: 2023-05-04

## 2023-05-04 RX ORDER — SODIUM CHLORIDE, SODIUM LACTATE, POTASSIUM CHLORIDE, AND CALCIUM CHLORIDE .6; .31; .03; .02 G/100ML; G/100ML; G/100ML; G/100ML
1000 INJECTION, SOLUTION INTRAVENOUS
Status: DISCONTINUED | OUTPATIENT
Start: 2023-05-04 | End: 2023-05-05 | Stop reason: HOSPADM

## 2023-05-04 RX ORDER — ALUMINA, MAGNESIA, AND SIMETHICONE 2400; 2400; 240 MG/30ML; MG/30ML; MG/30ML
30 SUSPENSION ORAL EVERY 6 HOURS PRN
Status: DISCONTINUED | OUTPATIENT
Start: 2023-05-04 | End: 2023-05-05 | Stop reason: HOSPADM

## 2023-05-04 RX ORDER — BUPIVACAINE HYDROCHLORIDE 2.5 MG/ML
INJECTION, SOLUTION EPIDURAL; INFILTRATION; INTRACAUDAL
Status: COMPLETED
Start: 2023-05-04 | End: 2023-05-04

## 2023-05-04 RX ORDER — LIDOCAINE HYDROCHLORIDE AND EPINEPHRINE 15; 5 MG/ML; UG/ML
INJECTION, SOLUTION EPIDURAL
Status: COMPLETED | OUTPATIENT
Start: 2023-05-04 | End: 2023-05-04

## 2023-05-04 RX ORDER — MISOPROSTOL 200 UG/1
TABLET ORAL
Status: COMPLETED
Start: 2023-05-04 | End: 2023-05-04

## 2023-05-04 RX ORDER — OXYTOCIN 10 [USP'U]/ML
10 INJECTION, SOLUTION INTRAMUSCULAR; INTRAVENOUS
Status: DISCONTINUED | OUTPATIENT
Start: 2023-05-04 | End: 2023-05-05 | Stop reason: HOSPADM

## 2023-05-04 RX ORDER — IBUPROFEN 800 MG/1
800 TABLET ORAL EVERY 6 HOURS PRN
Status: DISCONTINUED | OUTPATIENT
Start: 2023-05-04 | End: 2023-05-05 | Stop reason: HOSPADM

## 2023-05-04 RX ORDER — ACETAMINOPHEN 500 MG
1000 TABLET ORAL EVERY 6 HOURS PRN
Status: COMPLETED | OUTPATIENT
Start: 2023-05-04 | End: 2023-05-04

## 2023-05-04 RX ORDER — TERBUTALINE SULFATE 1 MG/ML
0.25 INJECTION, SOLUTION SUBCUTANEOUS
Status: DISCONTINUED | OUTPATIENT
Start: 2023-05-04 | End: 2023-05-05 | Stop reason: HOSPADM

## 2023-05-04 RX ORDER — ONDANSETRON 4 MG/1
4 TABLET, ORALLY DISINTEGRATING ORAL EVERY 6 HOURS PRN
Status: DISCONTINUED | OUTPATIENT
Start: 2023-05-04 | End: 2023-05-05 | Stop reason: HOSPADM

## 2023-05-04 RX ORDER — BUPIVACAINE HYDROCHLORIDE 2.5 MG/ML
INJECTION, SOLUTION EPIDURAL; INFILTRATION; INTRACAUDAL
Status: COMPLETED | OUTPATIENT
Start: 2023-05-04 | End: 2023-05-04

## 2023-05-04 RX ORDER — IBUPROFEN 800 MG/1
800 TABLET ORAL
Status: DISCONTINUED | OUTPATIENT
Start: 2023-05-04 | End: 2023-05-04

## 2023-05-04 RX ORDER — ONDANSETRON 2 MG/ML
4 INJECTION INTRAMUSCULAR; INTRAVENOUS EVERY 6 HOURS PRN
Status: DISCONTINUED | OUTPATIENT
Start: 2023-05-04 | End: 2023-05-05 | Stop reason: HOSPADM

## 2023-05-04 RX ORDER — HYDROXYZINE 50 MG/1
50 TABLET, FILM COATED ORAL EVERY 6 HOURS PRN
Status: DISCONTINUED | OUTPATIENT
Start: 2023-05-04 | End: 2023-05-05 | Stop reason: HOSPADM

## 2023-05-04 RX ADMIN — SODIUM CHLORIDE, POTASSIUM CHLORIDE, SODIUM LACTATE AND CALCIUM CHLORIDE 1000 ML: 600; 310; 30; 20 INJECTION, SOLUTION INTRAVENOUS at 11:46

## 2023-05-04 RX ADMIN — BUPIVACAINE HYDROCHLORIDE 7 ML: 2.5 INJECTION, SOLUTION EPIDURAL; INFILTRATION; INTRACAUDAL; PERINEURAL at 11:11

## 2023-05-04 RX ADMIN — ROPIVACAINE HYDROCHLORIDE 200 MG: 2 INJECTION, SOLUTION EPIDURAL; INFILTRATION at 11:28

## 2023-05-04 RX ADMIN — ROPIVACAINE HYDROCHLORIDE 200 MG: 2 INJECTION, SOLUTION EPIDURAL; INFILTRATION at 18:44

## 2023-05-04 RX ADMIN — LIDOCAINE HYDROCHLORIDE,EPINEPHRINE BITARTRATE 5 ML: 15; .005 INJECTION, SOLUTION EPIDURAL; INFILTRATION; INTRACAUDAL; PERINEURAL at 11:11

## 2023-05-04 RX ADMIN — MISOPROSTOL 200 MCG: 200 TABLET ORAL at 09:15

## 2023-05-04 RX ADMIN — IBUPROFEN 800 MG: 800 TABLET, FILM COATED ORAL at 21:31

## 2023-05-04 RX ADMIN — HYDROXYZINE HYDROCHLORIDE 50 MG: 50 TABLET, FILM COATED ORAL at 12:22

## 2023-05-04 RX ADMIN — AMPICILLIN SODIUM 2000 MG: 2 INJECTION, POWDER, FOR SOLUTION INTRAMUSCULAR; INTRAVENOUS at 17:10

## 2023-05-04 RX ADMIN — ROPIVACAINE HYDROCHLORIDE 200 MG: 2 INJECTION, SOLUTION EPIDURAL; INFILTRATION; PERINEURAL at 11:28

## 2023-05-04 RX ADMIN — ACETAMINOPHEN 1000 MG: 500 TABLET, FILM COATED ORAL at 16:55

## 2023-05-04 RX ADMIN — ROPIVACAINE HYDROCHLORIDE 200 MG: 2 INJECTION, SOLUTION EPIDURAL; INFILTRATION; PERINEURAL at 18:44

## 2023-05-04 ASSESSMENT — PAIN DESCRIPTION - PAIN TYPE
TYPE: ACUTE PAIN

## 2023-05-04 ASSESSMENT — PATIENT HEALTH QUESTIONNAIRE - PHQ9
SUM OF ALL RESPONSES TO PHQ9 QUESTIONS 1 AND 2: 2
1. LITTLE INTEREST OR PLEASURE IN DOING THINGS: SEVERAL DAYS
2. FEELING DOWN, DEPRESSED, IRRITABLE, OR HOPELESS: SEVERAL DAYS

## 2023-05-04 ASSESSMENT — LIFESTYLE VARIABLES
EVER_SMOKED: NEVER
ALCOHOL_USE: NO

## 2023-05-04 NOTE — PROGRESS NOTES
S:  Pt comfortable with epidural    O: Temp 100.7 P 97  BP  110/60  ABD: soft, NT  Cx: 2/50/np presenting part    A/P;  IUFD at 15 weeks, IOL - fair   IOL:  has received second dose of misoprostol.  Recheck in 6 hours  .  Mild temperature elevation: Start Ampicillin 2 gm IV load then 1 gram  IV Q 4 hours until delivery

## 2023-05-04 NOTE — H&P
DATE OF ADMISSION:  2023     ADMITTING DIAGNOSES:  1.  Intrauterine fetal demise at 15 and 6/7th weeks.  2.  History of a LEEP procedure in .     HISTORY OF PRESENT ILLNESS:  This is a 28-year-old  2, para 1 with an   estimated date of confinement of 10/20/2023 established by last menstrual   period consistent with an 8-week ultrasound, who presents to labor and   delivery for termination of an intrauterine fetal demise, diagnosed at 15   weeks and 1 day. .  The patient had a history of a LEEP procedure in  and   came into the office 3 days ago for cervical length ultrasound.  However, at   that time, it was noted that there was an intrauterine fetal demise at 15   weeks and 3 days.  The patient was told of the demise and given options of   surgical management or induction of labor.  She and her  spoke about it   and the following day, had requested an induction of labor.  She presented on   2023 and 5/3/2023 for serial laminaria placement.  She presents today for   labor induction.  Pros, cons, risks and benefits of this procedure have been   reviewed with her and she agrees to induction.     Prenatal care has been with Dr. Vela.  Her estimated date of confinement of   10/20/2023 was established by last menstrual period consistent with an 8-week   ultrasound.     PRENATAL LABS:  Her blood type is O positive, antibody screen negative, RPR   nonreactive, rubella immune, hepatitis B surface antigen negative, hepatitis C   negative, HIV negative.  She declined noninvasive  screening and   recessive gene screening and AFP.     Complications with the pregnancy, include a history of a LEEP in .  She   had a cervical length ultrasound performed at her first visit and it was 4.3   cm and when she presented for her 15-week cervical length ultrasound, it was 4   cm.  She has intrauterine fetal demise at 15 weeks and 1 day.  There is a posterior placenta with no previa. She had a    history during this pregnancy of 65 pound dog  running across her stomach and   she was seen in our office at 11 weeks.  There were no signs of a   subchorionic hemorrhage.  The fetus was viable and the patient was told that   it was unlikely that event would have caused any trauma, but there was no   certainty and it is unlikely that this demise was related to that trauma.     ALLERGIES:  She has no known drug allergies.     MEDICATIONS:  Include prenatal vitamins.     PAST MEDICAL HISTORY:  Negative.     PAST SURGICAL HISTORY:  Significant for LEEP procedure in .  She has had   eardrum repair.  She has had a tympanoplasty.     SOCIAL HISTORY:  She denies tobacco, alcohol or IV drug use.     FAMILY HISTORY:  She has a maternal grandmother and maternal great grandmother   with breast cancer.     GYNECOLOGIC HISTORY:  She has no history of any HSV.  She does have a history   of an abnormal Pap requiring a LEEP procedure.  She was unsure of the level of   dysplasia.     OBSTETRICAL HISTORY:   1 was a vacuum-assisted vaginal delivery, 6   pounds 13 ounces.   2 is her current pregnancy with a different father   of the baby.     PHYSICAL EXAMINATION:  VITAL SIGNS:  She is afebrile.  GENERAL:  She is in no apparent distress.  PELVIC:  Cervical exam at the time of removal of laminaria is 2 cm dilated.     IMPRESSION:  This is a 28-year-old  2, para 1 at 15 weeks and 6 days   with an intrauterine fetal demise, who is requesting induction of labor.     PLAN:  1.  The patient will be admitted to labor and delivery.  2.  Misoprostol 200 mcg per vagina will be placed q.6 hours until delivery is  accomplished.  3.  She is able to have an epidural.        ______________________________  MD ESTHER GARCIA/LION/BARRY    DD:  2023 09:11  DT:  2023 09:47    Job#:  [id number]

## 2023-05-04 NOTE — CARE PLAN
The patient is Stable - Low risk of patient condition declining or worsening    Shift Goals  Clinical Goals: Provide emotional support for FD  Patient Goals: Painless delivery.  Family Goals: Healthy mom    Progress made toward(s) clinical / shift goals:  Progressing     Problem: Knowledge Deficit - L&D  Goal: Patient and family/caregivers will demonstrate understanding of plan of care, disease process/condition, diagnostic tests and medications  Outcome: Progressing  Note: Pt will verbalize understanding of FD protocols.      Problem: Psychosocial - L&D  Goal: Patient's level of anxiety will decrease  Outcome: Progressing  Flowsheets (Taken 5/4/2023 1233)  Decrease Anxiety Level:   Collaborated with patient to identify and develop coping strategies   Encouraged support system participation   Implemented stimuli reduction, calming techniques       Patient is not progressing towards the following goals:

## 2023-05-04 NOTE — PROGRESS NOTES
S:  Pt requested and received epidural.  Discussed interval of  misoprostol at Q 6 hours.    VSS - afebrile    A/P:  IUFD at 15 weeks, IOL - doing well   Recheck cervix at 1500 hours.  Repeat dose of 200 mcg misoprostol if needed

## 2023-05-04 NOTE — ANESTHESIA PROCEDURE NOTES
Epidural Block    Date/Time: 5/4/2023 11:11 AM  Performed by: Hira Soto M.D.  Authorized by: Hira Soto M.D.     Patient Location:  OB  Start Time:  5/4/2023 11:11 AM  Reason for Block: labor analgesia    patient identified, IV checked, site marked, risks and benefits discussed, surgical consent, monitors and equipment checked, pre-op evaluation and timeout performed    Patient Position:  Sitting  Prep: ChloraPrep, patient draped and sterile technique    Monitoring:  Blood pressure, continuous pulse oximetry and heart rate  Approach:  Midline  Location:  L3-L4  Injection Technique:  KYLE saline  Skin infiltration:  Lidocaine  Strength:  1%  Dose:  3ml  Needle Type:  Tuohy  Needle Gauge:  17 G  Needle Length:  3.5 in  Loss of resistance::  5  Catheter Size:  19 G  Catheter at Skin Depth:  10

## 2023-05-04 NOTE — ANESTHESIA PREPROCEDURE EVALUATION
Date: 05/04/23  Procedure: Labor Epidural       Intrauterine fetal demise.     Relevant Problems   NEURO   (positive) Migraine without aura and without status migrainosus, not intractable      CARDIAC   (positive) Migraine without aura and without status migrainosus, not intractable       Physical Exam    Airway   Mallampati: II  TM distance: >3 FB  Neck ROM: full       Cardiovascular - normal exam  Rhythm: regular  Rate: normal  (-) murmur     Dental - normal exam           Pulmonary - normal exam  Breath sounds clear to auscultation     Abdominal    Neurological - normal exam                 Anesthesia Plan    ASA 2       Plan - epidural   Neuraxial block will be labor analgesia                  Pertinent diagnostic labs and testing reviewed    Informed Consent:    Anesthetic plan and risks discussed with patient.

## 2023-05-04 NOTE — PROGRESS NOTES
0845 28 y.o  Here for a scheduled induction for 15 week IUFD. Pt attended regularly scheduled prenatal appointment on Monday May 1st. No fetal heart tones found.   0915 RN and Dr. Vela to bedside. Laminaria and packing removed. 200 Mcg of Cytotec placed by provider. SVE by Dr. Vela 1-2 cm    1515 SVE  second dose of 200 mcg Misoprostol   1735 Pt called RN to bedside c/o increased pelvic pressure and back pain. Moderate amount light brown fluid present on washcloth Suspect SROM. SVE   Report to Dr. Brantley.    MD to bedside.   180  of non viable male infant. Triple tight nuchal cord. No other abnormalities noted by MD.   Adherent placenta.   Order for Pitocin 125 ml/hr. Pitocin started by MD order.

## 2023-05-05 VITALS
DIASTOLIC BLOOD PRESSURE: 51 MMHG | WEIGHT: 195 LBS | TEMPERATURE: 97.4 F | HEIGHT: 64 IN | BODY MASS INDEX: 33.29 KG/M2 | SYSTOLIC BLOOD PRESSURE: 103 MMHG | OXYGEN SATURATION: 96 % | HEART RATE: 92 BPM

## 2023-05-05 LAB — PATHOLOGY CONSULT NOTE: NORMAL

## 2023-05-05 PROCEDURE — A9270 NON-COVERED ITEM OR SERVICE: HCPCS | Performed by: OBSTETRICS & GYNECOLOGY

## 2023-05-05 PROCEDURE — 88305 TISSUE EXAM BY PATHOLOGIST: CPT

## 2023-05-05 PROCEDURE — 700102 HCHG RX REV CODE 250 W/ 637 OVERRIDE(OP): Performed by: OBSTETRICS & GYNECOLOGY

## 2023-05-05 PROCEDURE — 88300 SURGICAL PATH GROSS: CPT

## 2023-05-05 RX ORDER — OXYCODONE HYDROCHLORIDE 5 MG/1
5 TABLET ORAL EVERY 4 HOURS PRN
Status: DISCONTINUED | OUTPATIENT
Start: 2023-05-05 | End: 2023-05-05 | Stop reason: HOSPADM

## 2023-05-05 RX ORDER — ACETAMINOPHEN 500 MG
1000 TABLET ORAL EVERY 6 HOURS PRN
Status: CANCELLED | OUTPATIENT
Start: 2023-05-05

## 2023-05-05 RX ADMIN — OXYCODONE 5 MG: 5 TABLET ORAL at 03:07

## 2023-05-05 ASSESSMENT — PAIN SCALES - GENERAL: PAIN_LEVEL: 5

## 2023-05-05 NOTE — PROGRESS NOTES
0400- pt escorted off unit in wheel chair with family, acknowledges dc orders and feels comfortable to go home at this time.

## 2023-05-05 NOTE — L&D DELIVERY NOTE
DATE OF SERVICE:  2023     DELIVERY NOTE     PROCEDURES:    1.  Induction of labor.  2.  Epidural anesthesia.  3.  Spontaneous vaginal delivery.  4.  Sharp uterine curettage.     OBSTETRICIAN:  Guru Brantley MD     DIAGNOSES:    1.  15-week intrauterine fetal demise.  2.  Induced labor.  3.  Retained placenta.     COMPLICATIONS:  None.     ESTIMATED BLOOD LOSS:  200 mL     SPECIMENS SENT TO PATHOLOGY:  Placenta and multiple fragments of placenta and   membranes.     FINDINGS:  Baby--- male, 1 minute Apgar 0, 5 minute Apgar 0, baby has not been   weighed .     BRIEF HISTORY:  This 28-year-old lady is .  She presented for induction   of labor at 15 and 3/7 weeks' gestation after diagnosis of intrauterine fetal   demise. Dr. Vela placed laminaria tents on 2023 and 2023 and   removed them this morning prior to induction of labor.  The labor was   successfully induced with intravaginal misoprostol.  The patient received   epidural anesthesia.  The baby delivered spontaneously at 1808 today. This is   a male infant with no outward anomalies and Apgars of 0 and 0.  Intravenous   oxytocin was administered.  We waited patiently for the placenta as there was   not excessive bleeding.  The majority of the placenta delivered at 1923.    Intrauterine palpation and bedside ultrasound revealed retained products of conception in the lower uterus and cervix.    As the patient was well epiduralized, I felt we could accomplish  sharp   curettage in the delivery room.  A lighted speculum was placed.  I applied traction to   the cervix with a ring forceps.  I introduced a medium size banjo curette   until it was flush with the uterine fundus and curetted all aspects of the   uterine cavity several times.  Multiple fragments of membrane and some   fragments of placental tissue were extracted.  Curettage was discontinued   once curettage was producing no more tissue.  Post-curettage ultrasound reveals a    normal endometrial stripe with no evidence of retained products of conception.    Bimanual exam reveals a small firmly contracted uterus.  There is no   excessive bleeding.  Estimated blood loss for the entire delivery and third   stage was approximately 200 mL. Sponge and needle counts were correct.        ______________________________  MD SAHARA Ndiaye/RAFFY    DD:  05/04/2023 20:05  DT:  05/04/2023 22:22    Job#:  [id number]    CC:UDAY DEGROOT MD

## 2023-05-05 NOTE — PROGRESS NOTES
1900- report received from Jennifer PEREIRA and April RN  2118- Fercho PEREZ called for orders, orders received

## 2023-05-05 NOTE — ANESTHESIA POSTPROCEDURE EVALUATION
Patient: Jitendra Patel    Procedure Summary     Date: 05/04/23 Room / Location:     Anesthesia Start: 1101 Anesthesia Stop: 05/05/23 0000    Procedure: Labor Epidural Diagnosis:     Scheduled Providers:  Responsible Provider: Hira Soto M.D.    Anesthesia Type: epidural ASA Status: 2          Final Anesthesia Type: epidural  Last vitals  BP   Blood Pressure: 103/51    Temp   36.3 °C (97.4 °F)    Pulse   92   Resp        SpO2   96 %      Anesthesia Post Evaluation    Patient location during evaluation: PACU  Patient participation: complete - patient participated  Level of consciousness: awake and alert  Pain score: 5    Airway patency: patent  Anesthetic complications: no  Cardiovascular status: hemodynamically stable  Respiratory status: acceptable  Hydration status: euvolemic    PONV: none          There were no known notable events for this encounter.     Nurse Pain Score: 7 (NPRS)

## 2023-05-05 NOTE — PROGRESS NOTES
2330: report received from KELLI Oro  0030: pt continues to feel numb. Bleeding light. She denies concerns  0400: bleeding light. Pt ambulated and voided without difficulty. She was given pain intervention. Discharge order received per MD. Reviewed bleeding precautions and s/s to monitor for. Pt verbalized understanding and signed paperwork. She transferred off unit via wheelchair in stable condition     Ok to leave VM

## 2023-05-05 NOTE — ANESTHESIA TIME REPORT
Anesthesia Start and Stop Event Times     Date Time Event    5/4/2023 1100 Ready for Procedure     1101 Anesthesia Start    5/5/2023 0000 Anesthesia Stop        Responsible Staff  05/04/23 to 05/05/23    Name Role Begin End    Hira Soto M.D. Anesth 1101 0000        Overtime Reason:  no overtime (within assigned shift)    Comments:

## 2023-05-05 NOTE — L&D DELIVERY NOTE
(15 week IUFD)    Baby born at 18:08  Majority of placenta at 19:23    Palpation revealed retained POC    Sharp curettage completed at 19:48-in DR, with epidural  Multiple membrane fragments and some placenta    Postop U/S c/w complete delivery    Baby: male, APGARs 0-0, no outward anomalies    Pt. Desires early DC---will run pitocin 2 hrs, DC home in 3 hrs if stable  Rx OTC tylenol/ibuprofen PRN  Rh-positive

## 2023-10-05 ENCOUNTER — HOSPITAL ENCOUNTER (OUTPATIENT)
Dept: LAB | Facility: MEDICAL CENTER | Age: 28
End: 2023-10-05
Attending: OBSTETRICS & GYNECOLOGY
Payer: COMMERCIAL

## 2023-10-05 ENCOUNTER — HOSPITAL ENCOUNTER (OUTPATIENT)
Facility: MEDICAL CENTER | Age: 28
End: 2023-10-05
Attending: OBSTETRICS & GYNECOLOGY
Payer: COMMERCIAL

## 2023-10-05 LAB
ABO GROUP BLD: NORMAL
BASOPHILS # BLD AUTO: 0.4 % (ref 0–1.8)
BASOPHILS # BLD: 0.03 K/UL (ref 0–0.12)
BLD GP AB SCN SERPL QL: NORMAL
EOSINOPHIL # BLD AUTO: 0.07 K/UL (ref 0–0.51)
EOSINOPHIL NFR BLD: 1 % (ref 0–6.9)
ERYTHROCYTE [DISTWIDTH] IN BLOOD BY AUTOMATED COUNT: 42.9 FL (ref 35.9–50)
HBV SURFACE AG SER QL: NORMAL
HCT VFR BLD AUTO: 40 % (ref 37–47)
HCV AB SER QL: NORMAL
HGB BLD-MCNC: 13.2 G/DL (ref 12–16)
HIV 1+2 AB+HIV1 P24 AG SERPL QL IA: NORMAL
IMM GRANULOCYTES # BLD AUTO: 0.02 K/UL (ref 0–0.11)
IMM GRANULOCYTES NFR BLD AUTO: 0.3 % (ref 0–0.9)
LYMPHOCYTES # BLD AUTO: 2.22 K/UL (ref 1–4.8)
LYMPHOCYTES NFR BLD: 32.4 % (ref 22–41)
MCH RBC QN AUTO: 28.9 PG (ref 27–33)
MCHC RBC AUTO-ENTMCNC: 33 G/DL (ref 32.2–35.5)
MCV RBC AUTO: 87.5 FL (ref 81.4–97.8)
MONOCYTES # BLD AUTO: 0.37 K/UL (ref 0–0.85)
MONOCYTES NFR BLD AUTO: 5.4 % (ref 0–13.4)
NEUTROPHILS # BLD AUTO: 4.14 K/UL (ref 1.82–7.42)
NEUTROPHILS NFR BLD: 60.5 % (ref 44–72)
NRBC # BLD AUTO: 0 K/UL
NRBC BLD-RTO: 0 /100 WBC (ref 0–0.2)
PLATELET # BLD AUTO: 215 K/UL (ref 164–446)
PMV BLD AUTO: 10.6 FL (ref 9–12.9)
RBC # BLD AUTO: 4.57 M/UL (ref 4.2–5.4)
RH BLD: NORMAL
RUBV AB SER QL: 50.9 IU/ML
T PALLIDUM AB SER QL IA: NORMAL
WBC # BLD AUTO: 6.9 K/UL (ref 4.8–10.8)

## 2023-10-05 PROCEDURE — 87077 CULTURE AEROBIC IDENTIFY: CPT

## 2023-10-05 PROCEDURE — 86780 TREPONEMA PALLIDUM: CPT

## 2023-10-05 PROCEDURE — 86850 RBC ANTIBODY SCREEN: CPT

## 2023-10-05 PROCEDURE — 86762 RUBELLA ANTIBODY: CPT

## 2023-10-05 PROCEDURE — 87340 HEPATITIS B SURFACE AG IA: CPT

## 2023-10-05 PROCEDURE — 87591 N.GONORRHOEAE DNA AMP PROB: CPT

## 2023-10-05 PROCEDURE — 36415 COLL VENOUS BLD VENIPUNCTURE: CPT

## 2023-10-05 PROCEDURE — 85025 COMPLETE CBC W/AUTO DIFF WBC: CPT

## 2023-10-05 PROCEDURE — 88175 CYTOPATH C/V AUTO FLUID REDO: CPT

## 2023-10-05 PROCEDURE — 87491 CHLMYD TRACH DNA AMP PROBE: CPT

## 2023-10-05 PROCEDURE — 86803 HEPATITIS C AB TEST: CPT

## 2023-10-05 PROCEDURE — 87389 HIV-1 AG W/HIV-1&-2 AB AG IA: CPT

## 2023-10-05 PROCEDURE — 86900 BLOOD TYPING SEROLOGIC ABO: CPT

## 2023-10-05 PROCEDURE — 87086 URINE CULTURE/COLONY COUNT: CPT

## 2023-10-05 PROCEDURE — 86901 BLOOD TYPING SEROLOGIC RH(D): CPT

## 2023-10-07 LAB
BACTERIA UR CULT: ABNORMAL
BACTERIA UR CULT: ABNORMAL
SIGNIFICANT IND 70042: ABNORMAL
SITE SITE: ABNORMAL
SOURCE SOURCE: ABNORMAL

## 2023-10-09 LAB
C TRACH RRNA CVX QL NAA+PROBE: NEGATIVE
COMMENT NL11729A: NORMAL
CYTOLOGIST CVX/VAG CYTO: NORMAL
CYTOLOGY CVX/VAG DOC CYTO: NORMAL
CYTOLOGY CVX/VAG DOC THIN PREP: NORMAL
N GONORRHOEA RRNA CVX QL NAA+PROBE: NEGATIVE
NOTE NL11727A: NORMAL
OTHER STN SPEC: NORMAL
QC REVIEWED BY NL11722A: NORMAL
STAT OF ADQ CVX/VAG CYTO-IMP: NORMAL

## 2023-11-17 ENCOUNTER — APPOINTMENT (OUTPATIENT)
Dept: RADIOLOGY | Facility: MEDICAL CENTER | Age: 28
End: 2023-11-17
Attending: EMERGENCY MEDICINE
Payer: COMMERCIAL

## 2023-11-17 ENCOUNTER — HOSPITAL ENCOUNTER (EMERGENCY)
Facility: MEDICAL CENTER | Age: 28
End: 2023-11-17
Attending: EMERGENCY MEDICINE
Payer: COMMERCIAL

## 2023-11-17 VITALS
OXYGEN SATURATION: 96 % | TEMPERATURE: 97.2 F | HEART RATE: 98 BPM | RESPIRATION RATE: 19 BRPM | WEIGHT: 192.24 LBS | HEIGHT: 64 IN | SYSTOLIC BLOOD PRESSURE: 114 MMHG | DIASTOLIC BLOOD PRESSURE: 66 MMHG | BODY MASS INDEX: 32.82 KG/M2

## 2023-11-17 DIAGNOSIS — M54.16 ACUTE RIGHT LUMBAR RADICULOPATHY: ICD-10-CM

## 2023-11-17 DIAGNOSIS — E86.0 DEHYDRATION: ICD-10-CM

## 2023-11-17 DIAGNOSIS — R10.2 PELVIC PAIN AFFECTING PREGNANCY IN SECOND TRIMESTER, ANTEPARTUM: ICD-10-CM

## 2023-11-17 DIAGNOSIS — R11.2 NAUSEA AND VOMITING IN ADULT PATIENT: ICD-10-CM

## 2023-11-17 DIAGNOSIS — O26.892 PELVIC PAIN AFFECTING PREGNANCY IN SECOND TRIMESTER, ANTEPARTUM: ICD-10-CM

## 2023-11-17 LAB
ALBUMIN SERPL BCP-MCNC: 3.7 G/DL (ref 3.2–4.9)
ALBUMIN/GLOB SERPL: 1.2 G/DL
ALP SERPL-CCNC: 55 U/L (ref 30–99)
ALT SERPL-CCNC: 13 U/L (ref 2–50)
ANION GAP SERPL CALC-SCNC: 12 MMOL/L (ref 7–16)
APPEARANCE UR: CLEAR
AST SERPL-CCNC: 13 U/L (ref 12–45)
B-HCG SERPL-ACNC: ABNORMAL MIU/ML (ref 0–10)
BASOPHILS # BLD AUTO: 0.3 % (ref 0–1.8)
BASOPHILS # BLD: 0.03 K/UL (ref 0–0.12)
BILIRUB SERPL-MCNC: 0.3 MG/DL (ref 0.1–1.5)
BILIRUB UR QL STRIP.AUTO: NEGATIVE
BUN SERPL-MCNC: 6 MG/DL (ref 8–22)
CALCIUM ALBUM COR SERPL-MCNC: 8.7 MG/DL (ref 8.5–10.5)
CALCIUM SERPL-MCNC: 8.5 MG/DL (ref 8.4–10.2)
CHLORIDE SERPL-SCNC: 100 MMOL/L (ref 96–112)
CO2 SERPL-SCNC: 21 MMOL/L (ref 20–33)
COLOR UR: YELLOW
CREAT SERPL-MCNC: 0.5 MG/DL (ref 0.5–1.4)
EOSINOPHIL # BLD AUTO: 0.09 K/UL (ref 0–0.51)
EOSINOPHIL NFR BLD: 0.9 % (ref 0–6.9)
ERYTHROCYTE [DISTWIDTH] IN BLOOD BY AUTOMATED COUNT: 43.5 FL (ref 35.9–50)
GFR SERPLBLD CREATININE-BSD FMLA CKD-EPI: 132 ML/MIN/1.73 M 2
GLOBULIN SER CALC-MCNC: 3.2 G/DL (ref 1.9–3.5)
GLUCOSE SERPL-MCNC: 84 MG/DL (ref 65–99)
GLUCOSE UR STRIP.AUTO-MCNC: NEGATIVE MG/DL
HCT VFR BLD AUTO: 39.2 % (ref 37–47)
HGB BLD-MCNC: 13 G/DL (ref 12–16)
IMM GRANULOCYTES # BLD AUTO: 0.04 K/UL (ref 0–0.11)
IMM GRANULOCYTES NFR BLD AUTO: 0.4 % (ref 0–0.9)
KETONES UR STRIP.AUTO-MCNC: 15 MG/DL
LEUKOCYTE ESTERASE UR QL STRIP.AUTO: NEGATIVE
LIPASE SERPL-CCNC: 23 U/L (ref 11–82)
LYMPHOCYTES # BLD AUTO: 2.3 K/UL (ref 1–4.8)
LYMPHOCYTES NFR BLD: 22.2 % (ref 22–41)
MCH RBC QN AUTO: 29.6 PG (ref 27–33)
MCHC RBC AUTO-ENTMCNC: 33.2 G/DL (ref 32.2–35.5)
MCV RBC AUTO: 89.3 FL (ref 81.4–97.8)
MICRO URNS: ABNORMAL
MONOCYTES # BLD AUTO: 0.46 K/UL (ref 0–0.85)
MONOCYTES NFR BLD AUTO: 4.4 % (ref 0–13.4)
NEUTROPHILS # BLD AUTO: 7.44 K/UL (ref 1.82–7.42)
NEUTROPHILS NFR BLD: 71.8 % (ref 44–72)
NITRITE UR QL STRIP.AUTO: NEGATIVE
NRBC # BLD AUTO: 0 K/UL
NRBC BLD-RTO: 0 /100 WBC (ref 0–0.2)
NUMBER OF RH DOSES IND 8505RD: NORMAL
PH UR STRIP.AUTO: 6 [PH] (ref 5–8)
PLATELET # BLD AUTO: 321 K/UL (ref 164–446)
PLATELETS.RETICULATED NFR BLD AUTO: 1 % (ref 0.6–13.1)
PMV BLD AUTO: 13.9 FL (ref 9–12.9)
POTASSIUM SERPL-SCNC: 3.5 MMOL/L (ref 3.6–5.5)
PROT SERPL-MCNC: 6.9 G/DL (ref 6–8.2)
PROT UR QL STRIP: NEGATIVE MG/DL
RBC # BLD AUTO: 4.39 M/UL (ref 4.2–5.4)
RBC UR QL AUTO: NEGATIVE
RH BLD: NORMAL
SODIUM SERPL-SCNC: 133 MMOL/L (ref 135–145)
SP GR UR STRIP.AUTO: 1.02
WBC # BLD AUTO: 10.4 K/UL (ref 4.8–10.8)

## 2023-11-17 PROCEDURE — 96374 THER/PROPH/DIAG INJ IV PUSH: CPT

## 2023-11-17 PROCEDURE — 81003 URINALYSIS AUTO W/O SCOPE: CPT

## 2023-11-17 PROCEDURE — 700102 HCHG RX REV CODE 250 W/ 637 OVERRIDE(OP): Performed by: EMERGENCY MEDICINE

## 2023-11-17 PROCEDURE — 700105 HCHG RX REV CODE 258: Performed by: EMERGENCY MEDICINE

## 2023-11-17 PROCEDURE — 99285 EMERGENCY DEPT VISIT HI MDM: CPT

## 2023-11-17 PROCEDURE — A9270 NON-COVERED ITEM OR SERVICE: HCPCS | Performed by: EMERGENCY MEDICINE

## 2023-11-17 PROCEDURE — 36415 COLL VENOUS BLD VENIPUNCTURE: CPT

## 2023-11-17 PROCEDURE — 84702 CHORIONIC GONADOTROPIN TEST: CPT

## 2023-11-17 PROCEDURE — 700111 HCHG RX REV CODE 636 W/ 250 OVERRIDE (IP): Mod: JZ | Performed by: EMERGENCY MEDICINE

## 2023-11-17 PROCEDURE — 76815 OB US LIMITED FETUS(S): CPT

## 2023-11-17 PROCEDURE — 86901 BLOOD TYPING SEROLOGIC RH(D): CPT

## 2023-11-17 PROCEDURE — 85055 RETICULATED PLATELET ASSAY: CPT

## 2023-11-17 PROCEDURE — 85025 COMPLETE CBC W/AUTO DIFF WBC: CPT

## 2023-11-17 PROCEDURE — 83690 ASSAY OF LIPASE: CPT

## 2023-11-17 PROCEDURE — 80053 COMPREHEN METABOLIC PANEL: CPT

## 2023-11-17 RX ORDER — SODIUM CHLORIDE 9 MG/ML
1000 INJECTION, SOLUTION INTRAVENOUS ONCE
Status: COMPLETED | OUTPATIENT
Start: 2023-11-17 | End: 2023-11-17

## 2023-11-17 RX ORDER — ACETAMINOPHEN 325 MG/1
650 TABLET ORAL ONCE
Status: COMPLETED | OUTPATIENT
Start: 2023-11-17 | End: 2023-11-17

## 2023-11-17 RX ORDER — ONDANSETRON 2 MG/ML
4 INJECTION INTRAMUSCULAR; INTRAVENOUS ONCE
Status: COMPLETED | OUTPATIENT
Start: 2023-11-17 | End: 2023-11-17

## 2023-11-17 RX ADMIN — ACETAMINOPHEN 650 MG: 325 TABLET ORAL at 19:09

## 2023-11-17 RX ADMIN — SODIUM CHLORIDE 1000 ML: 9 INJECTION, SOLUTION INTRAVENOUS at 19:07

## 2023-11-17 RX ADMIN — ONDANSETRON 4 MG: 2 INJECTION INTRAMUSCULAR; INTRAVENOUS at 19:08

## 2023-11-18 NOTE — ED NOTES
Pt. Verbalizes understanding of discharge instructions. accompanied to lobby with spouse. Pt. Alert/awake in NAD.  All questions answered and understood. Advised to ff-up with PCP and OB-GYNE.

## 2023-11-18 NOTE — ED PROVIDER NOTES
ED Provider Note    CHIEF COMPLAINT  Chief Complaint   Patient presents with    Back Pain     Pt is 16 weeks pregnant and today she developed lowe back pain/cramping, at about 1300 she experienced a sharp right sided stabbing pain in her lower abd. No vaginal bleeding or discharge.   N/V today - she has had a little during pregnancy but today is worse.   No fevers. No diarrhea.     Abdominal Pain       EXTERNAL RECORDS REVIEWED  Inpatient Notes reviewed labor and delivery note for Dr. Brantley dated May 4, 2023.  Patient seen for induction of labor with epidural anesthesia.  Diagnosed with 15-week intrauterine fetal demise.  Postprocedure ultrasound demonstrated no evidence of retained products of conception.  Patient otherwise tolerated well.    HPI/ROS  LIMITATION TO HISTORY   Select: : None  OUTSIDE HISTORIAN(S):  Significant other at bedside endorses fetal loss in May at 16 weeks    Jitendra Coreas is a 28 y.o. female who presents for evaluation of acute back and abdominal pain.  Patient is at 16 weeks gestation.  She relates symptoms started today, initially a cramping sensation to the right side of the low back.  She notes this began to radiate to the right lower quadrant the abdomen at 1 PM today.  Pain is sharp, stabbing, no clear leaving or exacerbating factors.  She does note nausea with multiple episodes of emesis as well.  No fever, and no diarrhea, no dysuria, no hematuria.  She had kidney stones in the past and notes this is not similar.  She had a fetal loss of 15 weeks in May and given acute pain though she has no vaginal bleeding she is understandably concerned and came to be assessed.    PAST MEDICAL HISTORY   has a past medical history of Migraine and OM (otitis media).    SURGICAL HISTORY   has a past surgical history that includes myringotomy (1997) and tympanoplasty (1/18/2011).    FAMILY HISTORY  Family History   Problem Relation Age of Onset    Breast Cancer Maternal Grandmother 40     "Breast Cancer Paternal Grandmother 40    Ovarian Cancer Paternal Aunt 42       SOCIAL HISTORY  Social History     Tobacco Use    Smoking status: Never    Smokeless tobacco: Never   Vaping Use    Vaping Use: Never used   Substance and Sexual Activity    Alcohol use: No    Drug use: No    Sexual activity: Not on file       CURRENT MEDICATIONS  Home Medications    **Home medications have not yet been reviewed for this encounter**         ALLERGIES  No Known Allergies    PHYSICAL EXAM  VITAL SIGNS: /66   Pulse 98   Temp 36.2 °C (97.2 °F) (Temporal)   Resp 19   Ht 1.626 m (5' 4\")   Wt 87.2 kg (192 lb 3.9 oz)   SpO2 96%   BMI 33.00 kg/m²    General: Alert, no acute distress  Skin: Warm, dry, normal for ethnicity  Head: Normocephalic, atraumatic  Neck: Trachea midline, no tenderness  Eye: PERRL, normal conjunctiva  ENMT: Oral mucosa pink and dry, no pharyngeal erythema or exudate  Cardiovascular: Regular rate and rhythm, No murmur, Normal peripheral perfusion  Respiratory: Lungs CTA, respirations are non-labored, breath sounds are equal  Gastrointestinal: Soft, no CVA tenderness, tender to the right lower quadrant, no guarding, rebound, or rigidity.  Abdomen nondistended, bowel sounds are normal active.  Musculoskeletal: No swelling, no deformity.  Right SI joint tenderness, none on the left, no step-off.  Neurological: Alert and oriented to person, place, time, and situation.  5 x 5 dorsal and plantarflexion of both feet without foot drop, sensation lower extremities intact, no saddle anesthesia.  Lymphatics: No lymphadenopathy  Psychiatric: Cooperative, appropriate mood & affect     DIAGNOSTIC STUDIES / PROCEDURES      LABS  Results for orders placed or performed during the hospital encounter of 11/17/23   CBC WITH DIFFERENTIAL   Result Value Ref Range    WBC 10.4 4.8 - 10.8 K/uL    RBC 4.39 4.20 - 5.40 M/uL    Hemoglobin 13.0 12.0 - 16.0 g/dL    Hematocrit 39.2 37.0 - 47.0 %    MCV 89.3 81.4 - 97.8 fL    " MCH 29.6 27.0 - 33.0 pg    MCHC 33.2 32.2 - 35.5 g/dL    RDW 43.5 35.9 - 50.0 fL    Platelet Count 321 164 - 446 K/uL    MPV 13.9 (H) 9.0 - 12.9 fL    Neutrophils-Polys 71.80 44.00 - 72.00 %    Lymphocytes 22.20 22.00 - 41.00 %    Monocytes 4.40 0.00 - 13.40 %    Eosinophils 0.90 0.00 - 6.90 %    Basophils 0.30 0.00 - 1.80 %    Immature Granulocytes 0.40 0.00 - 0.90 %    Nucleated RBC 0.00 0.00 - 0.20 /100 WBC    Neutrophils (Absolute) 7.44 (H) 1.82 - 7.42 K/uL    Lymphs (Absolute) 2.30 1.00 - 4.80 K/uL    Monos (Absolute) 0.46 0.00 - 0.85 K/uL    Eos (Absolute) 0.09 0.00 - 0.51 K/uL    Baso (Absolute) 0.03 0.00 - 0.12 K/uL    Immature Granulocytes (abs) 0.04 0.00 - 0.11 K/uL    NRBC (Absolute) 0.00 K/uL   COMP METABOLIC PANEL   Result Value Ref Range    Sodium 133 (L) 135 - 145 mmol/L    Potassium 3.5 (L) 3.6 - 5.5 mmol/L    Chloride 100 96 - 112 mmol/L    Co2 21 20 - 33 mmol/L    Anion Gap 12.0 7.0 - 16.0    Glucose 84 65 - 99 mg/dL    Bun 6 (L) 8 - 22 mg/dL    Creatinine 0.50 0.50 - 1.40 mg/dL    Calcium 8.5 8.4 - 10.2 mg/dL    Correct Calcium 8.7 8.5 - 10.5 mg/dL    AST(SGOT) 13 12 - 45 U/L    ALT(SGPT) 13 2 - 50 U/L    Alkaline Phosphatase 55 30 - 99 U/L    Total Bilirubin 0.3 0.1 - 1.5 mg/dL    Albumin 3.7 3.2 - 4.9 g/dL    Total Protein 6.9 6.0 - 8.2 g/dL    Globulin 3.2 1.9 - 3.5 g/dL    A-G Ratio 1.2 g/dL   LIPASE   Result Value Ref Range    Lipase 23 11 - 82 U/L   HCG QUANTITATIVE   Result Value Ref Range    Bhcg 24083.0 (H) 0.0 - 10.0 mIU/mL   URINALYSIS,CULTURE IF INDICATED    Specimen: Urine   Result Value Ref Range    Color Yellow     Character Clear     Specific Gravity 1.025 <1.035    Ph 6.0 5.0 - 8.0    Glucose Negative Negative mg/dL    Ketones 15 (A) Negative mg/dL    Protein Negative Negative mg/dL    Bilirubin Negative Negative    Nitrite Negative Negative    Leukocyte Esterase Negative Negative    Occult Blood Negative Negative    Micro Urine Req see below    RH Type for Rhogam from [facility]    Result Value Ref Range    Emergency Department Rh Typing POS     Number Of Rh Doses Indicated ZERO    IMMATURE PLT FRACTION   Result Value Ref Range    Imm. Plt Fraction 1.0 0.6 - 13.1 %   ESTIMATED GFR   Result Value Ref Range    GFR (CKD-EPI) 132 >60 mL/min/1.73 m 2        RADIOLOGY  I have independently interpreted the diagnostic imaging associated with this visit and am waiting the final reading from the radiologist.   My preliminary interpretation is as follows: Intrauterine pregnancy with normal fetal heart motion proceeded  Radiologist interpretation:   US-OB LIMITED TRANSABDOMINAL   Final Result      Single live intrauterine pregnancy of an estimated gestational age of 16 weeks 0 days with an estimated date of delivery of 05- . Fetal heart tones were detected.              COURSE & MEDICAL DECISION MAKING    ED Observation Status? Yes; I am placing the patient in to an observation status due to a diagnostic uncertainty as well as therapeutic intensity. Patient placed in observation status at 6:54 PM, 11/17/2023.     Observation plan is as follows: Patient medicated with Zofran 4 mg IV, acetaminophen 600 mg p.o., normal saline 1 L.  Metabolic work-up with hCG quantitative and pelvic ultrasound will be obtained.  Differential diagnosis at this point includes but is not restricted to ovarian torsion, ovarian cyst, UTI, renal colic, threatened miscarriage, appendicitis, musculoskeletal pain, radiculopathy    2220: Patient is feeling better, updated with reassuring studies including ultrasound demonstrating normal in appearance intrauterine pregnancy.    Patient Vitals for the past 24 hrs:   BP Temp Temp src Pulse Resp SpO2 Height Weight   11/17/23 2200 114/66 36.2 °C (97.2 °F) Temporal 98 19 96 % -- --   11/17/23 2100 118/62 -- -- 92 18 96 % -- --   11/17/23 2000 113/58 -- -- 95 19 100 % -- --   11/17/23 1913 (!) 143/58 -- -- 92 19 99 % -- --   11/17/23 1626 130/86 36.2 °C (97.2 °F) Temporal 98 16 97 %  "-- --   11/17/23 1623 -- -- -- -- -- -- 1.626 m (5' 4\") 87.2 kg (192 lb 3.9 oz)        Upon Reevaluation, the patient's condition has: Improved; and will be discharged.    Patient discharged from ED Observation status at 2223 (Time) 11/17/23 (Date).     INITIAL ASSESSMENT, COURSE AND PLAN  Care Narrative: This patient is a well in appearance 28-year-old female who presents for evaluation of pain to the right flank and low back and radiates to the lower abdomen as well as down the right thigh.  No particular trauma.  She has no numbness nor weakness.  No vaginal bleeding but given she does have pain involving the abdomen and is in early second trimester pregnancy clear indication for pelvic ultrasound.  Think it is reassuring, normal in appearance pelvic organs, intrauterine pregnancy confirmed with normal fetal heart rate and heart tones.  Labs are reassuring.  She has no leukocytosis, no left shift, no bandemia.  Normal hepatic function, no evidence of pancreatitis.  She has no hematuria that would be concerning for obstructive uropathy/renal colic.  She has no evidence of sepsis nor pyelonephritis.  Suspect as such musculoskeletal pain especially considering the radiation down her right leg.  Patient feeling better with IV fluids and acetaminophen and is amenable to follow-up with her established OB/GYN.  HYDRATION: Based on the patient's presentation of Acute Vomiting and Dehydration the patient was given IV fluids. IV Hydration was used because oral hydration was not as rapid as required. Upon recheck following hydration, the patient was skin tone improving with IV fluids, heart rate improving, currently 92.      ADDITIONAL PROBLEM LIST  Right sided back pain rating down the leg, abdominal pain in pregnancy, dehydration, vomiting  DISPOSITION AND DISCUSSIONS  I have discussed management of the patient with the following physicians and LISSA's:  NA    Discussion of management with other QHP or appropriate " source(s): None     Escalation of care considered, and ultimately not performed:NA    Barriers to care at this time, including but not limited to:  NA .     Decision tools and prescription drugs considered including, but not limited to:  Tilley appendicitis score is 3, low risk stratification .    The patient will return for new or worsening symptoms and is stable at the time of discharge.    DISPOSITION:  Patient will be discharged home in stable condition.    FOLLOW UP:  Your OB/GYN    Schedule an appointment as soon as possible for a visit       Rick Ricci M.D.  [address]  [place] [address]  [phone]    Schedule an appointment as soon as possible for a visit         OUTPATIENT MEDICATIONS:  Discharge Medication List as of 11/17/2023 10:31 PM             FINAL DIAGNOSIS  1. Acute right lumbar radiculopathy    2. Pelvic pain affecting pregnancy in second trimester, antepartum    3. Nausea and vomiting in adult patient    4. Dehydration           Electronically signed by: Collin Tolentino M.D., 11/17/2023 6:44 PM

## 2023-11-18 NOTE — ED TRIAGE NOTES
Chief Complaint   Patient presents with    Back Pain     Pt is 16 weeks pregnant and today she developed lowe back pain/cramping, at about 1300 she experienced a sharp right sided stabbing pain in her lower abd. No vaginal bleeding or discharge.   N/V today - she has had a little during pregnancy but today is worse.   No fevers. No diarrhea.

## 2023-11-21 ENCOUNTER — TELEPHONE (OUTPATIENT)
Dept: HEALTH INFORMATION MANAGEMENT | Facility: OTHER | Age: 28
End: 2023-11-21
Payer: COMMERCIAL

## 2024-01-24 ENCOUNTER — HOSPITAL ENCOUNTER (EMERGENCY)
Facility: MEDICAL CENTER | Age: 29
End: 2024-01-24
Attending: OBSTETRICS & GYNECOLOGY | Admitting: OBSTETRICS & GYNECOLOGY
Payer: COMMERCIAL

## 2024-01-24 VITALS
RESPIRATION RATE: 19 BRPM | SYSTOLIC BLOOD PRESSURE: 127 MMHG | OXYGEN SATURATION: 96 % | BODY MASS INDEX: 34.15 KG/M2 | DIASTOLIC BLOOD PRESSURE: 64 MMHG | TEMPERATURE: 98.4 F | WEIGHT: 200 LBS | HEIGHT: 64 IN | HEART RATE: 37 BPM

## 2024-01-24 LAB
APPEARANCE UR: CLEAR
COLOR UR AUTO: YELLOW
FIBRONECTIN FETAL SPEC QL: POSITIVE
GLUCOSE UR QL STRIP.AUTO: NEGATIVE MG/DL
KETONES UR QL STRIP.AUTO: NEGATIVE MG/DL
LEUKOCYTE ESTERASE UR QL STRIP.AUTO: NEGATIVE
NITRITE UR QL STRIP.AUTO: NEGATIVE
PH UR STRIP.AUTO: 6.5 [PH] (ref 5–8)
PROT UR QL STRIP: NEGATIVE MG/DL
RBC UR QL AUTO: ABNORMAL
SP GR UR STRIP.AUTO: 1.02 (ref 1–1.03)

## 2024-01-24 PROCEDURE — 81002 URINALYSIS NONAUTO W/O SCOPE: CPT

## 2024-01-24 PROCEDURE — 82731 ASSAY OF FETAL FIBRONECTIN: CPT

## 2024-01-24 PROCEDURE — 302449 STATCHG TRIAGE ONLY (STATISTIC)

## 2024-01-24 ASSESSMENT — FIBROSIS 4 INDEX: FIB4 SCORE: 0.3

## 2024-01-24 ASSESSMENT — PAIN SCALES - GENERAL: PAINLEVEL: 0 - NO PAIN

## 2024-01-25 NOTE — DISCHARGE INSTRUCTIONS
Pre-term Labor (<37 weeks):  Call your physician or return to the hospital if:  You have painless regular contractions more than 4 in one hour.  Your water breaks (remember time and color).  You have menstrual-like cramps, a low dull backache or pressure in your pelvis or back.  Your baby does not move enough to complete the daily kick count (10 movements in 2 hours).  Your baby moves much less often than on the days before or you have not felt your baby move all day.  Please review the MEDICATION LIST section of your AFTER VISIT SUMMARY document.  Take your medication as prescribed  General Instructions:  If you think you are in labor, time contractions (lying on your left side) from the beginning of one contraction to the beginning of the next contraction for at least one hour.  Increase fluid intake: you should consume 10-12 8 oz glasses of non-caffeinated fluid per day.  Report any pressure or burning on urination to your physician.  Monitor fetal movement: If you notice an absence or decrease in fetal movement, drink a large glass of water and rest on your side.  If there is no increase in movement, call your physician or go to the hospital for further evaluation.  Report any sudden, sharp abdominal pain.  Report any bleeding.  Spotting or pinkish discharge is normal after vaginal exam.  You may also spot after sexual intercourse.    Pelvic rest for 2 weeks    Other Instructions:  Please carefully review your entire AFTER VISIT SUMMARY document for all discharge instructions.

## 2024-01-25 NOTE — PROGRESS NOTES
1504: Pt presents to L&D with c/o decreased fetal movement and increased vaginal pressure. Pt is a  at 25.5 weeks gestation. Pt states she had intercourse this am and has felt an increase in vaginal pressure since.   1556: Dr. Vela updated on pt's status and POC results, order received to collect an FFN and perform a gentle cervical exam.  1730: Dr. Vela updated on pt's +FFN. Dr. Vela aware of intercourse this am and believes this may have resulted in the + result. Dr. Vela aware of the long thick closed cervix. Discharge order received. Pt is to have pelvic rest X2 weeks and receive  labor precautions with discharge instructions.  1747: Discharge instructions given, pt and family verbalized understanding. Pt ambulated off the unit in stable condition.

## 2024-02-12 ENCOUNTER — OFFICE VISIT (OUTPATIENT)
Dept: URGENT CARE | Facility: PHYSICIAN GROUP | Age: 29
End: 2024-02-12
Payer: COMMERCIAL

## 2024-02-12 VITALS
SYSTOLIC BLOOD PRESSURE: 110 MMHG | WEIGHT: 213 LBS | BODY MASS INDEX: 36.37 KG/M2 | HEIGHT: 64 IN | TEMPERATURE: 98 F | DIASTOLIC BLOOD PRESSURE: 60 MMHG | RESPIRATION RATE: 18 BRPM | HEART RATE: 120 BPM | OXYGEN SATURATION: 97 %

## 2024-02-12 DIAGNOSIS — B34.9 NONSPECIFIC SYNDROME SUGGESTIVE OF VIRAL ILLNESS: Primary | ICD-10-CM

## 2024-02-12 DIAGNOSIS — R05.9 COUGH, UNSPECIFIED TYPE: ICD-10-CM

## 2024-02-12 DIAGNOSIS — J02.9 SORE THROAT: ICD-10-CM

## 2024-02-12 DIAGNOSIS — R52 GENERALIZED BODY ACHES: ICD-10-CM

## 2024-02-12 LAB
FLUAV RNA SPEC QL NAA+PROBE: NEGATIVE
FLUBV RNA SPEC QL NAA+PROBE: NEGATIVE
RSV RNA SPEC QL NAA+PROBE: NEGATIVE
S PYO DNA SPEC NAA+PROBE: NOT DETECTED
SARS-COV-2 RNA RESP QL NAA+PROBE: NEGATIVE

## 2024-02-12 PROCEDURE — 0241U POCT CEPHEID COV-2, FLU A/B, RSV - PCR: CPT | Performed by: PHYSICIAN ASSISTANT

## 2024-02-12 PROCEDURE — 87651 STREP A DNA AMP PROBE: CPT | Performed by: PHYSICIAN ASSISTANT

## 2024-02-12 PROCEDURE — 1125F AMNT PAIN NOTED PAIN PRSNT: CPT | Performed by: PHYSICIAN ASSISTANT

## 2024-02-12 PROCEDURE — 3078F DIAST BP <80 MM HG: CPT | Performed by: PHYSICIAN ASSISTANT

## 2024-02-12 PROCEDURE — 3074F SYST BP LT 130 MM HG: CPT | Performed by: PHYSICIAN ASSISTANT

## 2024-02-12 PROCEDURE — 99213 OFFICE O/P EST LOW 20 MIN: CPT | Performed by: PHYSICIAN ASSISTANT

## 2024-02-12 ASSESSMENT — FIBROSIS 4 INDEX: FIB4 SCORE: 0.3

## 2024-02-12 ASSESSMENT — ENCOUNTER SYMPTOMS
COUGH: 1
SORE THROAT: 1
SINUS PAIN: 0
VOMITING: 0
WHEEZING: 0
HEADACHES: 1
FEVER: 0

## 2024-02-12 ASSESSMENT — PAIN SCALES - GENERAL: PAINLEVEL: 3=SLIGHT PAIN

## 2024-02-26 ENCOUNTER — HOSPITAL ENCOUNTER (EMERGENCY)
Facility: MEDICAL CENTER | Age: 29
End: 2024-02-26
Attending: OBSTETRICS & GYNECOLOGY | Admitting: OBSTETRICS & GYNECOLOGY
Payer: COMMERCIAL

## 2024-02-26 VITALS
WEIGHT: 213 LBS | HEIGHT: 64 IN | OXYGEN SATURATION: 94 % | RESPIRATION RATE: 18 BRPM | TEMPERATURE: 98.4 F | HEART RATE: 97 BPM | BODY MASS INDEX: 36.37 KG/M2 | SYSTOLIC BLOOD PRESSURE: 128 MMHG | DIASTOLIC BLOOD PRESSURE: 72 MMHG

## 2024-02-26 LAB
APPEARANCE UR: ABNORMAL
COLOR UR AUTO: ABNORMAL
CRYSTALS AMN MICRO: NORMAL
FIBRONECTIN FETAL SPEC QL: NEGATIVE
GLUCOSE UR QL STRIP.AUTO: NEGATIVE MG/DL
KETONES UR QL STRIP.AUTO: 80 MG/DL
LEUKOCYTE ESTERASE UR QL STRIP.AUTO: NEGATIVE
NITRITE UR QL STRIP.AUTO: NEGATIVE
PH UR STRIP.AUTO: 6 [PH] (ref 5–8)
PROT UR QL STRIP: ABNORMAL MG/DL
RBC UR QL AUTO: ABNORMAL
SP GR UR STRIP.AUTO: >=1.03 (ref 1–1.03)

## 2024-02-26 PROCEDURE — 89060 EXAM SYNOVIAL FLUID CRYSTALS: CPT

## 2024-02-26 PROCEDURE — 59025 FETAL NON-STRESS TEST: CPT

## 2024-02-26 PROCEDURE — 81002 URINALYSIS NONAUTO W/O SCOPE: CPT

## 2024-02-26 PROCEDURE — 82731 ASSAY OF FETAL FIBRONECTIN: CPT

## 2024-02-26 PROCEDURE — 302449 STATCHG TRIAGE ONLY (STATISTIC)

## 2024-02-26 ASSESSMENT — FIBROSIS 4 INDEX: FIB4 SCORE: 0.3

## 2024-02-27 NOTE — PROGRESS NOTES
, due 5/3/24, 30w3d    1700 Pt presents to L&D with c/o irregular contractions about once an hour and states that during it she feels tightening, cramping and back pain. Pt denies any LOF or VB and reports +FM. EFM & TOCO applied. VSS.     Call to Dr. Brantley, report given. Orders received for PO hydration and FFN.     FFN collected and sent. Pt states she felt a gush of fluid a few days ago but no leaking since. Fern collected and sent.

## 2024-02-27 NOTE — PROGRESS NOTES
1926- dr sidhu updated on pt status. Reviewed fetal strip and results. Said strip was reactive and pt m,ay be d/c home.    1928- pt educated on hydrating and d/c instructions, pt v/u and and has no concerns.

## 2024-03-08 ENCOUNTER — HOSPITAL ENCOUNTER (OUTPATIENT)
Dept: LAB | Facility: MEDICAL CENTER | Age: 29
End: 2024-03-08
Attending: STUDENT IN AN ORGANIZED HEALTH CARE EDUCATION/TRAINING PROGRAM
Payer: COMMERCIAL

## 2024-03-08 LAB — PLATELET # BLD AUTO: 130 K/UL (ref 164–446)

## 2024-03-08 PROCEDURE — 85049 AUTOMATED PLATELET COUNT: CPT

## 2024-03-08 PROCEDURE — 80503 PATH CLIN CONSLTJ SF 5-20: CPT

## 2024-03-08 PROCEDURE — 36415 COLL VENOUS BLD VENIPUNCTURE: CPT

## 2024-03-11 LAB
PATH REV: NORMAL
PATH REV: NORMAL

## 2024-04-03 ENCOUNTER — HOSPITAL ENCOUNTER (OUTPATIENT)
Facility: MEDICAL CENTER | Age: 29
End: 2024-04-03
Attending: OBSTETRICS & GYNECOLOGY
Payer: COMMERCIAL

## 2024-04-05 LAB — GP B STREP DNA SPEC QL NAA+PROBE: NEGATIVE

## 2024-04-11 ENCOUNTER — HOSPITAL ENCOUNTER (OUTPATIENT)
Dept: LAB | Facility: MEDICAL CENTER | Age: 29
End: 2024-04-11
Attending: OBSTETRICS & GYNECOLOGY
Payer: COMMERCIAL

## 2024-04-11 LAB
BASOPHILS # BLD AUTO: 0 % (ref 0–1.8)
BASOPHILS # BLD: 0 K/UL (ref 0–0.12)
BURR CELLS BLD QL SMEAR: NORMAL
EOSINOPHIL # BLD AUTO: 0 K/UL (ref 0–0.51)
EOSINOPHIL NFR BLD: 0 % (ref 0–6.9)
ERYTHROCYTE [DISTWIDTH] IN BLOOD BY AUTOMATED COUNT: 54.6 FL (ref 35.9–50)
HCT VFR BLD AUTO: 38.5 % (ref 37–47)
HGB BLD-MCNC: 12.5 G/DL (ref 12–16)
LYMPHOCYTES # BLD AUTO: 2.03 K/UL (ref 1–4.8)
LYMPHOCYTES NFR BLD: 22.6 % (ref 22–41)
MANUAL DIFF BLD: NORMAL
MCH RBC QN AUTO: 31.7 PG (ref 27–33)
MCHC RBC AUTO-ENTMCNC: 32.5 G/DL (ref 32.2–35.5)
MCV RBC AUTO: 97.7 FL (ref 81.4–97.8)
MONOCYTES # BLD AUTO: 0.63 K/UL (ref 0–0.85)
MONOCYTES NFR BLD AUTO: 7 % (ref 0–13.4)
NEUTROPHILS # BLD AUTO: 6.34 K/UL (ref 1.82–7.42)
NEUTROPHILS NFR BLD: 70.4 % (ref 44–72)
PLATELET # BLD AUTO: 162 K/UL (ref 164–446)
PLATELET # BLD AUTO: 189 K/UL (ref 164–446)
PLATELET BLD QL SMEAR: NORMAL
PMV BLD AUTO: 11.4 FL (ref 9–12.9)
POIKILOCYTOSIS BLD QL SMEAR: NORMAL
RBC # BLD AUTO: 3.94 M/UL (ref 4.2–5.4)
RBC BLD AUTO: PRESENT
WBC # BLD AUTO: 9 K/UL (ref 4.8–10.8)

## 2024-04-11 PROCEDURE — 80503 PATH CLIN CONSLTJ SF 5-20: CPT

## 2024-04-11 PROCEDURE — 36415 COLL VENOUS BLD VENIPUNCTURE: CPT

## 2024-04-11 PROCEDURE — 85007 BL SMEAR W/DIFF WBC COUNT: CPT

## 2024-04-11 PROCEDURE — 85027 COMPLETE CBC AUTOMATED: CPT

## 2024-04-12 LAB
PATH REV: NORMAL
PATH REV: NORMAL

## 2024-04-20 ENCOUNTER — HOSPITAL ENCOUNTER (EMERGENCY)
Facility: MEDICAL CENTER | Age: 29
End: 2024-04-20
Attending: OBSTETRICS & GYNECOLOGY | Admitting: OBSTETRICS & GYNECOLOGY
Payer: COMMERCIAL

## 2024-04-20 VITALS
OXYGEN SATURATION: 97 % | WEIGHT: 225 LBS | HEIGHT: 64 IN | DIASTOLIC BLOOD PRESSURE: 73 MMHG | BODY MASS INDEX: 38.41 KG/M2 | HEART RATE: 99 BPM | TEMPERATURE: 97.6 F | RESPIRATION RATE: 18 BRPM | SYSTOLIC BLOOD PRESSURE: 124 MMHG

## 2024-04-20 PROCEDURE — 59025 FETAL NON-STRESS TEST: CPT

## 2024-04-20 PROCEDURE — 302449 STATCHG TRIAGE ONLY (STATISTIC)

## 2024-04-20 ASSESSMENT — FIBROSIS 4 INDEX: FIB4 SCORE: 0.6

## 2024-04-20 ASSESSMENT — PAIN SCALES - GENERAL: PAINLEVEL: 0 - NO PAIN

## 2024-04-21 NOTE — PROGRESS NOTES
G 3 P [id number] [facility] 5/3/24 EGA 38.1     2150 - Pt and family ambulate to LDA 1 presenting for CTX for about 1 weeks time that have become closer and stronger around 1900. Patient denies LOF/VB, affirms +FM. Patient denies other complications this pregnancy. SVE as noted in flowsheets.     2207 - Dr. Brantley given report. Orders to reassess BP, follow up with provider.     2256 -  given report. Discharge order received.     2305 - This RN to bedside. Discharge instructions, labor precaution, FKC, follow up and when to return reviewed with pt and family. All questions answered, no further needs stated at this time.     2311 - Pt and family ambulate off unit instable condition at this time.

## 2024-04-30 ENCOUNTER — HOSPITAL ENCOUNTER (INPATIENT)
Facility: MEDICAL CENTER | Age: 29
LOS: 2 days | End: 2024-05-02
Attending: OBSTETRICS & GYNECOLOGY | Admitting: OBSTETRICS & GYNECOLOGY
Payer: COMMERCIAL

## 2024-04-30 ENCOUNTER — APPOINTMENT (OUTPATIENT)
Dept: RADIOLOGY | Facility: MEDICAL CENTER | Age: 29
End: 2024-04-30
Attending: OBSTETRICS & GYNECOLOGY
Payer: COMMERCIAL

## 2024-04-30 ENCOUNTER — APPOINTMENT (OUTPATIENT)
Dept: OBGYN | Facility: MEDICAL CENTER | Age: 29
End: 2024-04-30
Attending: OBSTETRICS & GYNECOLOGY
Payer: COMMERCIAL

## 2024-04-30 ENCOUNTER — ANESTHESIA (OUTPATIENT)
Dept: ANESTHESIOLOGY | Facility: MEDICAL CENTER | Age: 29
End: 2024-04-30
Payer: COMMERCIAL

## 2024-04-30 ENCOUNTER — ANESTHESIA EVENT (OUTPATIENT)
Dept: ANESTHESIOLOGY | Facility: MEDICAL CENTER | Age: 29
End: 2024-04-30
Payer: COMMERCIAL

## 2024-04-30 DIAGNOSIS — Z91.89 AT RISK FOR BREASTFEEDING DIFFICULTY: ICD-10-CM

## 2024-04-30 LAB
ALBUMIN SERPL BCP-MCNC: 3.2 G/DL (ref 3.2–4.9)
ALBUMIN/GLOB SERPL: 1.1 G/DL
ALP SERPL-CCNC: 164 U/L (ref 30–99)
ALT SERPL-CCNC: 21 U/L (ref 2–50)
ANION GAP SERPL CALC-SCNC: 15 MMOL/L (ref 7–16)
AST SERPL-CCNC: 21 U/L (ref 12–45)
BASOPHILS # BLD AUTO: 0.4 % (ref 0–1.8)
BASOPHILS # BLD: 0.04 K/UL (ref 0–0.12)
BILIRUB SERPL-MCNC: 0.2 MG/DL (ref 0.1–1.5)
BUN SERPL-MCNC: 10 MG/DL (ref 8–22)
CALCIUM ALBUM COR SERPL-MCNC: 10 MG/DL (ref 8.5–10.5)
CALCIUM SERPL-MCNC: 9.4 MG/DL (ref 8.5–10.5)
CHLORIDE SERPL-SCNC: 104 MMOL/L (ref 96–112)
CO2 SERPL-SCNC: 18 MMOL/L (ref 20–33)
CREAT SERPL-MCNC: 0.45 MG/DL (ref 0.5–1.4)
EOSINOPHIL # BLD AUTO: 0.13 K/UL (ref 0–0.51)
EOSINOPHIL NFR BLD: 1.2 % (ref 0–6.9)
ERYTHROCYTE [DISTWIDTH] IN BLOOD BY AUTOMATED COUNT: 50.8 FL (ref 35.9–50)
GFR SERPLBLD CREATININE-BSD FMLA CKD-EPI: 135 ML/MIN/1.73 M 2
GLOBULIN SER CALC-MCNC: 2.8 G/DL (ref 1.9–3.5)
GLUCOSE SERPL-MCNC: 107 MG/DL (ref 65–99)
HCT VFR BLD AUTO: 39.7 % (ref 37–47)
HGB BLD-MCNC: 13.7 G/DL (ref 12–16)
HOLDING TUBE BB 8507: NORMAL
IMM GRANULOCYTES # BLD AUTO: 0.11 K/UL (ref 0–0.11)
IMM GRANULOCYTES NFR BLD AUTO: 1 % (ref 0–0.9)
LYMPHOCYTES # BLD AUTO: 2.53 K/UL (ref 1–4.8)
LYMPHOCYTES NFR BLD: 23.5 % (ref 22–41)
MCH RBC QN AUTO: 32.5 PG (ref 27–33)
MCHC RBC AUTO-ENTMCNC: 34.5 G/DL (ref 32.2–35.5)
MCV RBC AUTO: 94.1 FL (ref 81.4–97.8)
MONOCYTES # BLD AUTO: 0.84 K/UL (ref 0–0.85)
MONOCYTES NFR BLD AUTO: 7.8 % (ref 0–13.4)
NEUTROPHILS # BLD AUTO: 7.12 K/UL (ref 1.82–7.42)
NEUTROPHILS NFR BLD: 66.1 % (ref 44–72)
NRBC # BLD AUTO: 0 K/UL
NRBC BLD-RTO: 0 /100 WBC (ref 0–0.2)
PLATELET # BLD AUTO: 211 K/UL (ref 164–446)
PMV BLD AUTO: 10.5 FL (ref 9–12.9)
POTASSIUM SERPL-SCNC: 3.6 MMOL/L (ref 3.6–5.5)
PROT SERPL-MCNC: 6 G/DL (ref 6–8.2)
RBC # BLD AUTO: 4.22 M/UL (ref 4.2–5.4)
SODIUM SERPL-SCNC: 137 MMOL/L (ref 135–145)
T PALLIDUM AB SER QL IA: NORMAL
WBC # BLD AUTO: 10.8 K/UL (ref 4.8–10.8)

## 2024-04-30 RX ORDER — OXYTOCIN 10 [USP'U]/ML
10 INJECTION, SOLUTION INTRAMUSCULAR; INTRAVENOUS
Status: DISCONTINUED | OUTPATIENT
Start: 2024-04-30 | End: 2024-05-01 | Stop reason: HOSPADM

## 2024-04-30 RX ORDER — SODIUM CHLORIDE, SODIUM LACTATE, POTASSIUM CHLORIDE, AND CALCIUM CHLORIDE .6; .31; .03; .02 G/100ML; G/100ML; G/100ML; G/100ML
250 INJECTION, SOLUTION INTRAVENOUS PRN
Status: DISCONTINUED | OUTPATIENT
Start: 2024-04-30 | End: 2024-05-01 | Stop reason: HOSPADM

## 2024-04-30 RX ORDER — DEXMEDETOMIDINE HYDROCHLORIDE 100 UG/ML
INJECTION, SOLUTION INTRAVENOUS PRN
Status: DISCONTINUED | OUTPATIENT
Start: 2024-04-30 | End: 2024-05-01 | Stop reason: SURG

## 2024-04-30 RX ORDER — LIDOCAINE HYDROCHLORIDE AND EPINEPHRINE 15; 5 MG/ML; UG/ML
INJECTION, SOLUTION EPIDURAL
Status: COMPLETED | OUTPATIENT
Start: 2024-04-30 | End: 2024-04-30

## 2024-04-30 RX ORDER — ASPIRIN 81 MG/1
81 TABLET ORAL DAILY
COMMUNITY

## 2024-04-30 RX ORDER — EPHEDRINE SULFATE 50 MG/ML
5 INJECTION, SOLUTION INTRAVENOUS
Status: DISCONTINUED | OUTPATIENT
Start: 2024-04-30 | End: 2024-05-01 | Stop reason: HOSPADM

## 2024-04-30 RX ORDER — IBUPROFEN 800 MG/1
800 TABLET ORAL
Status: COMPLETED | OUTPATIENT
Start: 2024-04-30 | End: 2024-05-01

## 2024-04-30 RX ORDER — ACETAMINOPHEN 325 MG/1
650 TABLET ORAL EVERY 6 HOURS PRN
Status: DISCONTINUED | OUTPATIENT
Start: 2024-04-30 | End: 2024-05-01

## 2024-04-30 RX ORDER — SODIUM CHLORIDE, SODIUM LACTATE, POTASSIUM CHLORIDE, AND CALCIUM CHLORIDE .6; .31; .03; .02 G/100ML; G/100ML; G/100ML; G/100ML
1000 INJECTION, SOLUTION INTRAVENOUS
Status: DISCONTINUED | OUTPATIENT
Start: 2024-04-30 | End: 2024-05-01 | Stop reason: HOSPADM

## 2024-04-30 RX ORDER — BUPIVACAINE HYDROCHLORIDE 2.5 MG/ML
INJECTION, SOLUTION EPIDURAL; INFILTRATION; INTRACAUDAL
Status: ACTIVE
Start: 2024-04-30 | End: 2024-05-01

## 2024-04-30 RX ORDER — ROPIVACAINE HYDROCHLORIDE 2 MG/ML
INJECTION, SOLUTION EPIDURAL; INFILTRATION
Status: COMPLETED | OUTPATIENT
Start: 2024-04-30 | End: 2024-04-30

## 2024-04-30 RX ORDER — TERBUTALINE SULFATE 1 MG/ML
0.25 INJECTION, SOLUTION SUBCUTANEOUS
Status: DISCONTINUED | OUTPATIENT
Start: 2024-04-30 | End: 2024-05-01 | Stop reason: HOSPADM

## 2024-04-30 RX ORDER — ACETAMINOPHEN 500 MG
1000 TABLET ORAL
Status: COMPLETED | OUTPATIENT
Start: 2024-04-30 | End: 2024-04-30

## 2024-04-30 RX ORDER — ROPIVACAINE HYDROCHLORIDE 2 MG/ML
INJECTION, SOLUTION EPIDURAL; INFILTRATION; PERINEURAL CONTINUOUS
Status: DISCONTINUED | OUTPATIENT
Start: 2024-04-30 | End: 2024-05-02 | Stop reason: HOSPADM

## 2024-04-30 RX ORDER — LIDOCAINE HYDROCHLORIDE 10 MG/ML
20 INJECTION, SOLUTION INFILTRATION; PERINEURAL
Status: COMPLETED | OUTPATIENT
Start: 2024-04-30 | End: 2024-05-01

## 2024-04-30 RX ORDER — ROPIVACAINE HYDROCHLORIDE 2 MG/ML
INJECTION, SOLUTION EPIDURAL; INFILTRATION; PERINEURAL
Status: COMPLETED
Start: 2024-04-30 | End: 2024-04-30

## 2024-04-30 RX ORDER — SODIUM CHLORIDE, SODIUM LACTATE, POTASSIUM CHLORIDE, CALCIUM CHLORIDE 600; 310; 30; 20 MG/100ML; MG/100ML; MG/100ML; MG/100ML
INJECTION, SOLUTION INTRAVENOUS CONTINUOUS
Status: DISCONTINUED | OUTPATIENT
Start: 2024-04-30 | End: 2024-05-02 | Stop reason: HOSPADM

## 2024-04-30 RX ADMIN — SODIUM CHLORIDE, POTASSIUM CHLORIDE, SODIUM LACTATE AND CALCIUM CHLORIDE: 600; 310; 30; 20 INJECTION, SOLUTION INTRAVENOUS at 09:48

## 2024-04-30 RX ADMIN — ROPIVACAINE HYDROCHLORIDE: 2 INJECTION, SOLUTION EPIDURAL; INFILTRATION at 20:18

## 2024-04-30 RX ADMIN — MISOPROSTOL 25 MCG: 100 TABLET ORAL at 05:36

## 2024-04-30 RX ADMIN — DEXMEDETOMIDINE HYDROCHLORIDE 30 MCG: 100 INJECTION, SOLUTION INTRAVENOUS at 22:35

## 2024-04-30 RX ADMIN — ROPIVACAINE HYDROCHLORIDE 5 ML: 5 INJECTION, SOLUTION EPIDURAL; INFILTRATION; PERINEURAL at 21:49

## 2024-04-30 RX ADMIN — ROPIVACAINE HYDROCHLORIDE 5 ML: 5 INJECTION, SOLUTION EPIDURAL; INFILTRATION; PERINEURAL at 22:35

## 2024-04-30 RX ADMIN — OXYTOCIN 1 MILLI-UNITS/MIN: 10 INJECTION INTRAVENOUS at 10:05

## 2024-04-30 RX ADMIN — ROPIVACAINE HYDROCHLORIDE 5 ML: 5 INJECTION, SOLUTION EPIDURAL; INFILTRATION; PERINEURAL at 21:50

## 2024-04-30 RX ADMIN — ROPIVACAINE HYDROCHLORIDE 200 MG: 2 INJECTION, SOLUTION EPIDURAL; INFILTRATION at 14:12

## 2024-04-30 RX ADMIN — ROPIVACAINE HYDROCHLORIDE 5 ML: 5 INJECTION, SOLUTION EPIDURAL; INFILTRATION; PERINEURAL at 14:12

## 2024-04-30 RX ADMIN — SODIUM CHLORIDE, POTASSIUM CHLORIDE, SODIUM LACTATE AND CALCIUM CHLORIDE: 600; 310; 30; 20 INJECTION, SOLUTION INTRAVENOUS at 15:23

## 2024-04-30 RX ADMIN — ACETAMINOPHEN 1000 MG: 500 TABLET, FILM COATED ORAL at 19:11

## 2024-04-30 RX ADMIN — LIDOCAINE HYDROCHLORIDE,EPINEPHRINE BITARTRATE 5 ML: 15; .005 INJECTION, SOLUTION EPIDURAL; INFILTRATION; INTRACAUDAL; PERINEURAL at 14:04

## 2024-04-30 ASSESSMENT — LIFESTYLE VARIABLES
EVER HAD A DRINK FIRST THING IN THE MORNING TO STEADY YOUR NERVES TO GET RID OF A HANGOVER: NO
CONSUMPTION TOTAL: INCOMPLETE
TOTAL SCORE: 0
HAVE PEOPLE ANNOYED YOU BY CRITICIZING YOUR DRINKING: NO
TOTAL SCORE: 0
ALCOHOL_USE: NO
HAVE YOU EVER FELT YOU SHOULD CUT DOWN ON YOUR DRINKING: NO
EVER FELT BAD OR GUILTY ABOUT YOUR DRINKING: NO
EVER_SMOKED: NEVER
TOTAL SCORE: 0
DOES PATIENT WANT TO STOP DRINKING: NO

## 2024-04-30 ASSESSMENT — PATIENT HEALTH QUESTIONNAIRE - PHQ9
2. FEELING DOWN, DEPRESSED, IRRITABLE, OR HOPELESS: NOT AT ALL
SUM OF ALL RESPONSES TO PHQ9 QUESTIONS 1 AND 2: 0
1. LITTLE INTEREST OR PLEASURE IN DOING THINGS: NOT AT ALL

## 2024-04-30 ASSESSMENT — PAIN SCALES - GENERAL: PAINLEVEL: 0 - NO PAIN

## 2024-04-30 ASSESSMENT — FIBROSIS 4 INDEX: FIB4 SCORE: 0.6

## 2024-04-30 NOTE — ANESTHESIA PREPROCEDURE EVALUATION
Date: 04/30/24  Procedure: Labor Epidural         Relevant Problems   NEURO   (positive) Migraine without aura and without status migrainosus, not intractable      CARDIAC   (positive) Migraine without aura and without status migrainosus, not intractable       Physical Exam    Airway   Mallampati: II  TM distance: >3 FB  Neck ROM: full       Cardiovascular - normal exam  Rhythm: regular  Rate: normal  (-) murmur     Dental - normal exam           Pulmonary - normal exam  Breath sounds clear to auscultation     Abdominal    Neurological - normal exam                   Anesthesia Plan    ASA 2       Plan - epidural   Neuraxial block will be labor analgesia                  Pertinent diagnostic labs and testing reviewed    Informed Consent:    Anesthetic plan and risks discussed with patient.

## 2024-04-30 NOTE — PROGRESS NOTES
S:  Pt feeling contractions stronger.  Requesting epidural    O:  VSS - afebrile  FHTs 150s Cat 1  [place] Q 2-3  Cx 2-3/50/-2  Pit 2    A/P;  IUP at 39 4/7 weeks, IOL - doing well   Labor:  Changed to Pitocin.  Place epidural. Recheck in 2 hours  FWB: reassuring

## 2024-04-30 NOTE — H&P
DATE OF ADMISSION:  2024     ADMITTING DIAGNOSES:  1.  Intrauterine pregnancy at 39 and 4/7th weeks.  2.  History of COVID at 5 weeks.  3.  History of a LEEP procedure.  4.  History of an intrauterine fetal demise at 15 weeks.  5.  Transient idiopathic thrombocytopenia of pregnancy.  6.  Obesity.     HISTORY:  This is a 29-year-old  3, para 1 with an estimated date of   confinement of 2024 established by last menstrual period consistent with   a 9-week ultrasound, who presents to labor and delivery today for induction   of labor.  She has a history of an intrauterine fetal demise, COVID and ITP   which has resolved.  She understands there is an increased risk of an   operative delivery with induction of labor.  All of her questions were   answered and she agrees to induction of labor.     Prenatal care has been with Dr. Vela.  Her estimated date of confinement of   2024, was established by last menstrual period consistent with a 9-week   ultrasound.     PRENATAL LABS:  Her blood type is O positive, antibody screen negative, RPR   nonreactive, rubella immune, hepatitis B surface antigen negative, hepatitis C   negative, HIV negative.  Her 1-hour Glucola was normal.  Her group B strep   status is negative.  She declined noninvasive  screening and   recessive gene screen.     Complications with the pregnancy include a history of a LEEP procedure in   .  Her cervical lengths have been normal in the 1st and 2nd trimester.    She has had a total weight gain for the pregnancy of 36 pounds.  She has a   posterior placenta with no previa. At approximately 26 weeks, she was noted to   have thrombocytopenia.  She had idiopathic thrombocytopenia of pregnancy with   a platelet count of 100,000.  Repeat evaluation in 2 weeks showed a drop to   90,000.  She was seen by [facility], who did Doppler studies and there was   concern for continued ITP; however, repeat laboratory studies showed  that her   platelet count returned to normal and on admission today is 211,000.     PAST MEDICAL HISTORY:  ALLERGIES:  She has no known drug allergies.     MEDICATIONS:  Include prenatal vitamins and aspirin.     PAST MEDICAL HISTORY:  She has a history of COVID at 5 weeks.  She had   transient idiopathic thrombocytopenia of pregnancy, which resolved.     PAST SURGICAL HISTORY:  She had a history of a LEEP procedure in .  She   had a D and C for retained placenta after an intrauterine fetal demise at 15   weeks.  She also has had myringotomy tubes placed.     SOCIAL HISTORY:  She denies tobacco, alcohol or IV drug use.     FAMILY HISTORY:  She has a maternal grandmother and maternal great grandmother   with breast cancer.     GYNECOLOGIC HISTORY:  She has no history of any HSV.  She did have a history   of a cervical dysplasia, which required a LEEP procedure.  She was not sure of   the level of dysplasia.  All of her Paps have been normal since.     OBSTETRICAL HISTORY:   1 with a normal spontaneous vaginal delivery,   female 6 pounds 13 ounces, no complications and this is a different father of   the baby.   2 is intrauterine fetal demise at 15 weeks with a tight   nuchal cord x2.  She did require D and C for retained placenta.   3 is   her current pregnancy.     PHYSICAL EXAMINATION:  VITAL SIGNS:  Temperature is 97, pulse is 94, blood pressure 124/75.  GENERAL:  She is pleasant, in no apparent distress.  ABDOMEN:  Gravid and nontender.  EXTREMITIES:  Show +1 pedal edema.  She has no cords or Homans sign.  PELVIC:  Fetal heart tones are in the 130s and a category 1 tracing.    Tocometry shows occasional contractions.  Cervical exam per the nurse on   admission, she was 1 cm dilated, 10% effaced, -3 station, vertex presentation.    Estimated fetal weight is 3300 grams.     IMPRESSION:  This is a 29-year-old  3, para 1 at 39 and 4/7th weeks who   presents to labor and delivery for  induction of labor secondary to history of   COVID at 5 weeks, history of a LEEP and history of an intrauterine fetal   demise at 15 weeks who is doing well.     PLAN:  1.  The patient has been admitted to labor and delivery.  2.  Saline lock has been placed.  3.  She has received 1 dose of Cytotec 25 mcg.  She will be rechecked in 4   hours.  4.  There is currently reassuring fetal surveillance.        ______________________________  MD ESTHER GARCIA/KELLI    DD:  04/30/2024 08:07  DT:  04/30/2024 08:28    Job#:  [id number]

## 2024-04-30 NOTE — PROGRESS NOTES
Ob on-call    I was asked to confirm presentation prior to induction.  FHR Cat I.  U/S-cephalic presentation, ROT position.

## 2024-04-30 NOTE — CARE PLAN
The patient is Stable - Low risk of patient condition declining or worsening    Shift Goals  Clinical Goals: Cervical effacement and dilation  Patient Goals: Healthy mom, healthy baby  Family Goals: Support    Progress made toward(s) clinical / shift goals: Progressing  Problem: Knowledge Deficit - L&D  Goal: Patient and family/caregivers will demonstrate understanding of plan of care, disease process/condition, diagnostic tests and medications  Outcome: Progressing  Note: Pt questions and concerns answered. Will continue to include pt in POC and educate as necessary.     Problem: Risk for Injury  Goal: Patient and fetus will be free of preventable injury/complications  Outcome: Progressing  Note: Continuous external fetal and maternal monitoring.

## 2024-04-30 NOTE — PROGRESS NOTES
0655- Report from KELLI Saleem. Care assumed.     1910- Report to KELLI Greene. Care relinquished.

## 2024-04-30 NOTE — PROGRESS NOTES
0400: Mrs. Ham Mecum is a  at 39.4 weeks. Present to L&D for elective IOL. FOB Fady and pt mom Daja at bedside. Denies LOF, vaginal bleeding, states +FM. Pt questions and concerns about induction answered. GBS negative. SVE 1/thick/high.     0500: Dr. Brantley to bedside. Vertex confirmed via ultrasound. ROT position.    0535: Misoprostol placed.     0655: Bedside report given to KELLI Boyd.

## 2024-05-01 LAB
ERYTHROCYTE [DISTWIDTH] IN BLOOD BY AUTOMATED COUNT: 53.6 FL (ref 35.9–50)
HCT VFR BLD AUTO: 38 % (ref 37–47)
HGB BLD-MCNC: 12.5 G/DL (ref 12–16)
MCH RBC QN AUTO: 31.8 PG (ref 27–33)
MCHC RBC AUTO-ENTMCNC: 32.9 G/DL (ref 32.2–35.5)
MCV RBC AUTO: 96.7 FL (ref 81.4–97.8)
PLATELET # BLD AUTO: 162 K/UL (ref 164–446)
PLATELET # BLD AUTO: 164 K/UL (ref 164–446)
PMV BLD AUTO: 11.3 FL (ref 9–12.9)
RBC # BLD AUTO: 3.93 M/UL (ref 4.2–5.4)
WBC # BLD AUTO: 15.1 K/UL (ref 4.8–10.8)

## 2024-05-01 RX ORDER — ONDANSETRON 2 MG/ML
INJECTION INTRAMUSCULAR; INTRAVENOUS
Status: COMPLETED
Start: 2024-05-01 | End: 2024-05-01

## 2024-05-01 RX ORDER — SODIUM CHLORIDE, SODIUM LACTATE, POTASSIUM CHLORIDE, CALCIUM CHLORIDE 600; 310; 30; 20 MG/100ML; MG/100ML; MG/100ML; MG/100ML
INJECTION, SOLUTION INTRAVENOUS PRN
Status: DISCONTINUED | OUTPATIENT
Start: 2024-05-01 | End: 2024-05-02 | Stop reason: HOSPADM

## 2024-05-01 RX ORDER — IBUPROFEN 800 MG/1
800 TABLET ORAL EVERY 8 HOURS PRN
Status: DISCONTINUED | OUTPATIENT
Start: 2024-05-01 | End: 2024-05-02 | Stop reason: HOSPADM

## 2024-05-01 RX ORDER — MISOPROSTOL 200 UG/1
800 TABLET ORAL ONCE
Status: COMPLETED | OUTPATIENT
Start: 2024-05-01 | End: 2024-05-01

## 2024-05-01 RX ORDER — OXYCODONE HYDROCHLORIDE 5 MG/1
5 TABLET ORAL EVERY 4 HOURS PRN
Status: DISCONTINUED | OUTPATIENT
Start: 2024-05-01 | End: 2024-05-02 | Stop reason: HOSPADM

## 2024-05-01 RX ORDER — MISOPROSTOL 200 UG/1
800 TABLET ORAL ONCE
Status: DISCONTINUED | OUTPATIENT
Start: 2024-05-01 | End: 2024-05-01

## 2024-05-01 RX ORDER — ONDANSETRON 2 MG/ML
4 INJECTION INTRAMUSCULAR; INTRAVENOUS EVERY 4 HOURS PRN
Status: DISCONTINUED | OUTPATIENT
Start: 2024-05-01 | End: 2024-05-02 | Stop reason: HOSPADM

## 2024-05-01 RX ORDER — DOCUSATE SODIUM 100 MG/1
100 CAPSULE, LIQUID FILLED ORAL 2 TIMES DAILY PRN
Status: DISCONTINUED | OUTPATIENT
Start: 2024-05-01 | End: 2024-05-02 | Stop reason: HOSPADM

## 2024-05-01 RX ORDER — CALCIUM CARBONATE 500 MG/1
1000 TABLET, CHEWABLE ORAL EVERY 6 HOURS PRN
Status: DISCONTINUED | OUTPATIENT
Start: 2024-05-01 | End: 2024-05-02 | Stop reason: HOSPADM

## 2024-05-01 RX ORDER — METHYLERGONOVINE MALEATE 0.2 MG/ML
0.2 INJECTION INTRAVENOUS
Status: DISCONTINUED | OUTPATIENT
Start: 2024-05-01 | End: 2024-05-02 | Stop reason: HOSPADM

## 2024-05-01 RX ORDER — DEXTROSE, SODIUM CHLORIDE, SODIUM LACTATE, POTASSIUM CHLORIDE, AND CALCIUM CHLORIDE 5; .6; .31; .03; .02 G/100ML; G/100ML; G/100ML; G/100ML; G/100ML
INJECTION, SOLUTION INTRAVENOUS CONTINUOUS
Status: DISCONTINUED | OUTPATIENT
Start: 2024-05-01 | End: 2024-05-02 | Stop reason: HOSPADM

## 2024-05-01 RX ORDER — ACETAMINOPHEN 500 MG
1000 TABLET ORAL EVERY 6 HOURS PRN
Status: DISCONTINUED | OUTPATIENT
Start: 2024-05-01 | End: 2024-05-02 | Stop reason: HOSPADM

## 2024-05-01 RX ORDER — MISOPROSTOL 200 UG/1
TABLET ORAL
Status: COMPLETED
Start: 2024-05-01 | End: 2024-05-01

## 2024-05-01 RX ORDER — VITAMIN A ACETATE, BETA CAROTENE, ASCORBIC ACID, CHOLECALCIFEROL, .ALPHA.-TOCOPHEROL ACETATE, DL-, THIAMINE MONONITRATE, RIBOFLAVIN, NIACINAMIDE, PYRIDOXINE HYDROCHLORIDE, FOLIC ACID, CYANOCOBALAMIN, CALCIUM CARBONATE, FERROUS FUMARATE, ZINC OXIDE, CUPRIC OXIDE 3080; 12; 120; 400; 1; 1.84; 3; 20; 22; 920; 25; 200; 27; 10; 2 [IU]/1; UG/1; MG/1; [IU]/1; MG/1; MG/1; MG/1; MG/1; MG/1; [IU]/1; MG/1; MG/1; MG/1; MG/1; MG/1
1 TABLET, FILM COATED ORAL
Status: DISCONTINUED | OUTPATIENT
Start: 2024-05-01 | End: 2024-05-02 | Stop reason: HOSPADM

## 2024-05-01 RX ORDER — SIMETHICONE 125 MG
125 TABLET,CHEWABLE ORAL 4 TIMES DAILY PRN
Status: DISCONTINUED | OUTPATIENT
Start: 2024-05-01 | End: 2024-05-02 | Stop reason: HOSPADM

## 2024-05-01 RX ADMIN — MISOPROSTOL 800 MCG: 200 TABLET ORAL at 10:19

## 2024-05-01 RX ADMIN — OXYTOCIN 125 ML/HR: 10 INJECTION INTRAVENOUS at 11:15

## 2024-05-01 RX ADMIN — DEXMEDETOMIDINE HYDROCHLORIDE 40 MCG: 100 INJECTION, SOLUTION INTRAVENOUS at 01:53

## 2024-05-01 RX ADMIN — SODIUM CHLORIDE, SODIUM LACTATE, POTASSIUM CHLORIDE, CALCIUM CHLORIDE AND DEXTROSE MONOHYDRATE: 5; 600; 310; 30; 20 INJECTION, SOLUTION INTRAVENOUS at 09:17

## 2024-05-01 RX ADMIN — LIDOCAINE HYDROCHLORIDE 20 ML: 10 INJECTION, SOLUTION INFILTRATION; PERINEURAL at 10:18

## 2024-05-01 RX ADMIN — BUPIVACAINE HYDROCHLORIDE 5 ML: 2.5 INJECTION, SOLUTION EPIDURAL; INFILTRATION; INTRACAUDAL; PERINEURAL at 01:55

## 2024-05-01 RX ADMIN — BUPIVACAINE HYDROCHLORIDE 5 ML: 2.5 INJECTION, SOLUTION EPIDURAL; INFILTRATION; INTRACAUDAL; PERINEURAL at 01:53

## 2024-05-01 RX ADMIN — ROPIVACAINE HYDROCHLORIDE: 2 INJECTION, SOLUTION EPIDURAL; INFILTRATION at 07:08

## 2024-05-01 RX ADMIN — ACETAMINOPHEN 1000 MG: 500 TABLET, FILM COATED ORAL at 20:26

## 2024-05-01 RX ADMIN — ONDANSETRON 4 MG: 2 INJECTION INTRAMUSCULAR; INTRAVENOUS at 07:23

## 2024-05-01 RX ADMIN — OXYTOCIN 20 UNITS: 10 INJECTION INTRAVENOUS at 10:21

## 2024-05-01 RX ADMIN — ACETAMINOPHEN 650 MG: 325 TABLET, FILM COATED ORAL at 12:22

## 2024-05-01 RX ADMIN — IBUPROFEN 800 MG: 800 TABLET, FILM COATED ORAL at 12:23

## 2024-05-01 RX ADMIN — ROPIVACAINE HYDROCHLORIDE: 2 INJECTION, SOLUTION EPIDURAL; INFILTRATION at 00:56

## 2024-05-01 ASSESSMENT — PAIN DESCRIPTION - PAIN TYPE
TYPE: ACUTE PAIN

## 2024-05-01 NOTE — ANESTHESIA POSTPROCEDURE EVALUATION
Patient: Jitendra Banks Mecum    Procedure Summary       Date: 04/30/24 Room / Location:     Anesthesia Start: 1335 Anesthesia Stop: 05/01/24 1003    Procedure: Labor Epidural Diagnosis:     Scheduled Providers:  Responsible Provider: Daniel Cardenas D.O.    Anesthesia Type: epidural ASA Status: 2            Final Anesthesia Type: epidural  Last vitals  BP   Blood Pressure: 126/62    Temp   37.1 °C (98.7 °F)    Pulse   (!) 116   Resp   18    SpO2   98 %      Anesthesia Post Evaluation    Patient location during evaluation: floor  Patient participation: complete - patient participated  Level of consciousness: awake and alert    Airway patency: patent  Anesthetic complications: no  Cardiovascular status: hemodynamically stable  Respiratory status: acceptable  Hydration status: euvolemic    PONV: none    patient able to participate, but full recovery from regional anesthesia has not occurred and is not expected within the stipulated timeframe for the completion of the evaluation      No notable events documented.     Nurse Pain Score: 9 (NPRS)

## 2024-05-01 NOTE — CARE PLAN
The patient is Stable - Low risk of patient condition declining or worsening    Shift Goals  Clinical Goals: VSS, lochia WDL, pain WDL  Patient Goals:   Family Goals:     Progress made toward(s) clinical / shift goals:  VSS, lochia scant, pain controlled with rest, encouraged to call with pain needs    Problem: Knowledge Deficit - Postpartum  Goal: Patient will verbalize and demonstrate understanding of self and infant care  Outcome: Progressing     Problem: Psychosocial - Postpartum  Goal: Patient will verbalize and demonstrate effective bonding and parenting behavior  Outcome: Progressing       Patient is not progressing towards the following goals:

## 2024-05-01 NOTE — CARE PLAN
Problem: Knowledge Deficit - L&D  Goal: Patient and family/caregivers will demonstrate understanding of plan of care, disease process/condition, diagnostic tests and medications  Outcome: Progressing     Problem: Risk for Excess Fluid Volume  Goal: Patient will demonstrate pulse, blood pressure and neurologic signs within expected ranges and without any respiratory complications  Outcome: Progressing     Problem: Psychosocial - L&D  Goal: Patient's level of anxiety will decrease  4/30/2024 1937 by Ramona Salinas R.N.  Outcome: Progressing  4/30/2024 1936 by Ramona Salinas R.N.  Outcome: Progressing  Goal: Patient will be able to discuss coping skills during hospitalization  4/30/2024 1937 by Rmaona Salinas R.N.  Outcome: Progressing  4/30/2024 1936 by Ramona Salinas R.N.  Outcome: Progressing  Goal: Patient's ability to re-evaluate and adapt role responsibilities will improve  4/30/2024 1937 by Ramona Salinas R.N.  Outcome: Progressing  4/30/2024 1936 by Ramona Salinas R.N.  Outcome: Progressing  Goal: Spiritual and cultural needs incorporated into hospitalization  Outcome: Progressing     Problem: Risk for Venous Thromboembolism (VTE)  Goal: VTE prevention measures will be implemented and patient will remain free from VTE  Outcome: Progressing     Problem: Risk for Infection and Impaired Wound Healing  Goal: Patient will remain free from infection  Outcome: Progressing  Goal: Patient's wound/surgical incision will decrease in size and heals properly  Outcome: Progressing     Problem: Risk for Fluid Imbalance  Goal: Patient's fluid volume balance will be maintained or improve  Outcome: Progressing     Problem: Risk for Injury  Goal: Patient and fetus will be free of preventable injury/complications  Outcome: Progressing     Problem: Pain  Goal: Patient's pain will be alleviated or reduced to the patient’s comfort goal  Outcome: Progressing     Problem: Discharge Barriers/Planning  Goal: Patient's continuum of care needs are  met  Outcome: Progressing   The patient is Stable - Low risk of patient condition declining or worsening    Shift Goals  Clinical Goals: dilate  Patient Goals: have a baby  Family Goals: support        Patient is not progressing towards the following goals:

## 2024-05-01 NOTE — L&D DELIVERY NOTE
DATE OF SERVICE:  2024     PROCEDURES:    1.  Induction of labor.  2.  Epidural anesthesia.  3.  Spontaneous vaginal delivery.  4.  Repair of second-degree perineal laceration.     OBSTETRICIAN:  Guru Brantley MD     DIAGNOSES:    1.  A 39 and 5/7th week gestation, delivered.  2.  Induced labor.     COMPLICATIONS:  None.     ESTIMATED BLOOD LOSS:  900 mL.     SPECIMENS SENT TO PATHOLOGY:  None.     FINDINGS:  Baby ---male, 1 minute Apgar 8, 5 minute Apgar 8, weight 4115 grams     BRIEF HISTORY:  This 29-year-old lady is now .  She came for elective   induction yesterday.  Her cervix was induced with intravaginal misoprostol   followed by IV oxytocin.  Her membranes were ruptured approximately 13 hours   before delivery, clear fluid was noted.  She progressed slowly and steadily.    Second stage lasted 2.5 hours.  The nurses felt that the baby was occiput   posterior early in the second stage. Maternal position changes were enacted.    The baby came out occiput anterior.  She pushed the baby out occiput anterior   in a controlled fashion with no episiotomy.  I bulb suctioned the baby's   mouth.  There were no nuchal cords.  The shoulders and body delivered easily.    I placed the baby on her chest and 2 minutes later, the cord was doubly   clamped and cut.  The intact placenta and all trailing membranes delivered   spontaneously in a timely fashion.  Second stage lasted 2.5 hours, third stage   lasted 7 minutes.  The patient sustained a jagged second-degree perineal   laceration to the right of midline.  I cleansed the wound with Betadine   solution, administered supplemental 1% lidocaine local anesthetic and closed   the wound with layered running 3-0 Vicryl suture.  Sponge and needle counts   were correct.     Estimated blood loss was 900 mL secondary to uterine atony.  This was   addressed with bimanual clot expression, IV Pitocin and misoprostol 800 mcg   rectally.  Presently, the degree of  bleeding is within normal limits.  Uterine   tone is adequate.  Sponge and needle counts were correct.        ______________________________  MD SAHARA Ndiaye/RAFFY    DD:  05/01/2024 10:42  DT:  05/01/2024 11:08    Job#:  [id number]    CC:UDAY DEGROOT MD

## 2024-05-01 NOTE — PROGRESS NOTES
S:  Pt was getting relief fro epidural, but now C/O pressure    O:  VSS - afebrile  FHTs 140s Cat 1  [place] Q 3  Cx: 6/100/-1  Pitocin 7    A/P:  IUP at 39 5/7 weeks, IOL for hx of COVID, IUFD at 15 weeks - doing well   Labor:  Recheck in 2 hours.    FWB: reassuring

## 2024-05-01 NOTE — ANESTHESIA TIME REPORT
Anesthesia Start and Stop Event Times       Date Time Event    4/30/2024 1335 Anesthesia Start     1355 Ready for Procedure    5/1/2024 1003 Anesthesia Stop          Responsible Staff  04/30/24 to 05/01/24      Name Role Begin End    Ben James M.D. Anesth 1335 1003    Daniel Cardenas D.O. Anesth 0658 1003          Overtime Reason:  no overtime (within assigned shift)    Comments:

## 2024-05-01 NOTE — PROGRESS NOTES
Labor Progress Note    Jitendra Jocelyn Mecum   39w5d      Subjective:  Pt comfortable with epidural without urge to push.  Uterine contractions:yes  Pain: No    Objective:   Vitals:    24 0335 24 0355 24 0415 24 0500   BP: 111/57 111/58 110/61 108/56   Pulse: 100 (!) 102 (!) 115 (!) 108   Resp:       Temp:       TempSrc:       SpO2:       Weight:       Height:       FHT: 130's cat 1  [place]: q 2 min     Membranes ruptured: .yes    Meds:   Epidural : .yes  Pitocin: .yes    Labs:  No results found for this or any previous visit (from the past 24 hour(s)).    Assessment:   39w5d/active labor-expt ..  Fetal status-reassuring  GBBS negsergey Villanueva M.D.

## 2024-05-01 NOTE — PROGRESS NOTES
Labor Progress Note    Jitendra Jocelyn Mecum   39w5d      Subjective:  Pt with complaint of nausea and heart burn.  Uterine contractions:yes  Pain: No    Objective:   Vitals:    24 0553 24 0600 24 0615 24 0635   BP: 115/71 115/71 120/59 116/57   Pulse: (!) 118 (!) 118 (!) 122 (!) 112   Resp:       Temp:       TempSrc:       SpO2:       Weight:       Height:       FHT: 140's cat I-II  [place]: q 2  SVE:c/c/+2, cervical lip reduced.  Membranes ruptured: .yes    Meds:   Epidural : .yes  Pitocin: .yes, 9 mu    Labs:  No results found for this or any previous visit (from the past 24 hour(s)).    Assessment:   39w5d/complete-push, exp .  GBBS negative  Fetal status-reassuring        Pat Villanueva M.D.

## 2024-05-01 NOTE — L&D DELIVERY NOTE
(dictated)    Baby: male, APGARs 8-8, not weighed yet.  2nd stage 2.5 hrs  Plac spont, 3rd stage 7 minutes  No epis, 2nd degree lac, 1% lido, betadine prep, 3-0 vicryl     cc addressed with clot expression, pitocin, misoprostol 800 mcg rectally.

## 2024-05-01 NOTE — CARE PLAN
The patient is Watcher - Medium risk of patient condition declining or worsening    Shift Goals  Clinical Goals: pain management/ monitor fetal tolerance of labor  Patient Goals: delivery of a healthy baby  Family Goals: emotional support      Problem: Knowledge Deficit - L&D  Goal: Patient and family/caregivers will demonstrate understanding of plan of care, disease process/condition, diagnostic tests and medications  Outcome: Progressing   POC discussed with pt. Pt verbalizes understanding and asks questions as needed.    Problem: Pain  Goal: Patient's pain will be alleviated or reduced to the patient’s comfort goal  Outcome: Progressing   Pain management options discussed with pt. Pt able to verbalize a pain rating on pain scale. Pt will ask for intervention as needed.

## 2024-05-01 NOTE — PROGRESS NOTES
Patient arrive to S3 via wheelchair with L&D RN. Report received from KELLI Mchugh. Assessment complete. Denies pain at this time. Patient oriented to room, call light, infant security, emergency light, and unit routine. Encourage to call for assistance. Visiting policy discussed.

## 2024-05-01 NOTE — PROGRESS NOTES
S:  Pt still uncomfortable with contractions. Anesthesia to evaluate    O: VSS - afebrile  FHTS 140s CAt 1  [place] Q 3  Cx: 4/80/-2  Pitocin 7    A/P:  IUP at 39 4/7 weeks, IOL - doing well   Labor:  recheck in 2 hours.  Continue Pitocin and position changes  FWB: reassuring

## 2024-05-01 NOTE — PROGRESS NOTES
S:  Pt better with contractions and position change    O: VSS - afebrile  FHTs 130s Cat 1  [place]: Q 2  Cx: Ant lip/100/-1  Pit 8    A/P:  IUP at 39 5/7 weeks, IOL for hx of COVID, IUFD at 15 weeks, ITP (resolved) - doing well   Labor:  Progressing.  Recheck in 30 minutes and begin second stage if complete  FWB: reassuring

## 2024-05-01 NOTE — PROGRESS NOTES
S:  Pt requested and received epidural, but is still feeing some mild discomfort with contractions    O: VSS - afebrile  FHTs 130s Cat 1  [place] Q 3  Cx: 2/50/-3 ballotable  Pitocin  6    A/P:  IUP at 39 4/7 weeks, IOL for hx of COVID, IUFD at 15 weeks - doing well   Labor:  Unable to AROM at this time.  Recheck in 2 hours  FWB: reassuring

## 2024-05-01 NOTE — LACTATION NOTE
This note was copied from a baby's chart.  Initial LC visit, mother reports difficulty breastfeeding first baby, reports current infant latched after birth and was recently fed a supplement for low glucose level. Introduced lactation services and encouraged to call for assistance with breastfeeding by RN and/or LC at next feeding. Reviewed importance of skin to skin time and encouraged feeding expressed colostrum if infant sleepy when attempting latch, mother reports she already knows how to hand express. Mother denies questions/concerns at this time.

## 2024-05-01 NOTE — PROGRESS NOTES
0700 - Report received from KELLI Greene.  0708 - Dr. Villanueva at BS. Pt complete/100/+2.  0713 - Pt reports lightheadedness/nausea. Pulse ox applied. Pt tachycardic/vomiting.   0723 - Zofran given (see MAR). Apple juice given. Pt reports feeling better and is ready to start pushing.  0732 - FHT audible to this RN at BS. EFM not tracing well on monitors. New EFM applied.  0739 - Pt starts second stage of labor  1003 - Viable baby boy delivered by Dr. Brantley. APGARS 8/8.  mL. See provider note/ Delivery Summary.  1235 - Pt up to bathroom and voided. New gown, peripads, and peribottle provided. Pt tolerated ambulation, shuffling gait with x1 SBA.   1247 - Pt transferred to postpartum in wheelchair, infant in arms, FOB and Pt mother at side. Handoff report given to KELLI Whitman at BS. Pt has no new questions or concerns at this time.

## 2024-05-01 NOTE — PROGRESS NOTES
S:  Pt was able to have epidural adjusted and is comfortable.  Discussed AROM.  Pt agrees     O: VSS - afebrile  FHTs 130s Cat 1 Clawson Q 2  Cx: 3/50/-2  AROM clear  Pitocin 7    A/P:  IUP at 39 4/7 weeks, IOL - doing well   Labor:  Recheck in 2 hours. Place IUPC if unchanged  FWB: reassuring

## 2024-05-01 NOTE — CARE PLAN
The patient is Stable - Low risk of patient condition declining or worsening    Shift Goals  Clinical Goals: cervical dilation and effacement  Patient Goals: pain control  Family Goals: show appropriate support    Progress made toward(s) clinical / shift goals:    Problem: Knowledge Deficit - L&D  Goal: Patient and family/caregivers will demonstrate understanding of plan of care, disease process/condition, diagnostic tests and medications  Outcome: Progressing  Note: Pt and support educated on POC. All questions answered      Problem: Pain  Goal: Patient's pain will be alleviated or reduced to the patient’s comfort goal  Outcome: Progressing  Note: Pt pain well controlled with epidural        Patient is not progressing towards the following goals:

## 2024-05-02 VITALS
BODY MASS INDEX: 38.41 KG/M2 | OXYGEN SATURATION: 96 % | HEART RATE: 96 BPM | DIASTOLIC BLOOD PRESSURE: 76 MMHG | TEMPERATURE: 97.1 F | HEIGHT: 64 IN | SYSTOLIC BLOOD PRESSURE: 124 MMHG | RESPIRATION RATE: 16 BRPM | WEIGHT: 225 LBS

## 2024-05-02 PROCEDURE — 0KQM0ZZ REPAIR PERINEUM MUSCLE, OPEN APPROACH: ICD-10-PCS | Performed by: OBSTETRICS & GYNECOLOGY

## 2024-05-02 PROCEDURE — 3E033VJ INTRODUCTION OF OTHER HORMONE INTO PERIPHERAL VEIN, PERCUTANEOUS APPROACH: ICD-10-PCS | Performed by: OBSTETRICS & GYNECOLOGY

## 2024-05-02 RX ADMIN — PRENATAL WITH FERROUS FUM AND FOLIC ACID 1 TABLET: 3080; 920; 120; 400; 22; 1.84; 3; 20; 10; 1; 12; 200; 27; 25; 2 TABLET ORAL at 08:05

## 2024-05-02 RX ADMIN — ACETAMINOPHEN 1000 MG: 500 TABLET, FILM COATED ORAL at 08:07

## 2024-05-02 RX ADMIN — IBUPROFEN 800 MG: 800 TABLET, FILM COATED ORAL at 01:45

## 2024-05-02 ASSESSMENT — EDINBURGH POSTNATAL DEPRESSION SCALE (EPDS)
I HAVE BEEN ANXIOUS OR WORRIED FOR NO GOOD REASON: YES, SOMETIMES
I HAVE FELT SCARED OR PANICKY FOR NO GOOD REASON: NO, NOT MUCH
THINGS HAVE BEEN GETTING ON TOP OF ME: NO, MOST OF THE TIME I HAVE COPED QUITE WELL
I HAVE BLAMED MYSELF UNNECESSARILY WHEN THINGS WENT WRONG: NOT VERY OFTEN
THE THOUGHT OF HARMING MYSELF HAS OCCURRED TO ME: NEVER
I HAVE BEEN SO UNHAPPY THAT I HAVE HAD DIFFICULTY SLEEPING: NOT AT ALL
I HAVE BEEN SO UNHAPPY THAT I HAVE BEEN CRYING: NO, NEVER
I HAVE LOOKED FORWARD WITH ENJOYMENT TO THINGS: AS MUCH AS I EVER DID
I HAVE FELT SAD OR MISERABLE: NO, NOT AT ALL
I HAVE BEEN ABLE TO LAUGH AND SEE THE FUNNY SIDE OF THINGS: AS MUCH AS I ALWAYS COULD

## 2024-05-02 ASSESSMENT — PAIN DESCRIPTION - PAIN TYPE
TYPE: ACUTE PAIN

## 2024-05-02 NOTE — DISCHARGE INSTRUCTIONS
PATIENT DISCHARGE EDUCATION INSTRUCTION SHEET    REASONS TO CALL YOUR OBSTETRICIAN  Persistent fever, shaking, chills (Temperature higher than 100.4) may indicate you have an infection  Heavy bleeding: soaking more than 1 pad per hour; Passing clots an egg-sized clot or bigger may mean you have an postpartum hemorrhage  Foul odor from vagina or bad smelling or discolored discharge or blood  Breast infection (Mastitis symptoms); breast pain, chills, fever, redness or red streaks, may feel flu like symptoms  Urinary pain, burning or frequency  Incision that is not healing, increased redness, swelling, tenderness or pain, or any pus from episiotomy or  site may mean you have an infection  Redness, swelling, warmth, or painful to touch in the calf area of your leg may mean you have a blood clot  Severe or intensified depression, thoughts or feelings of wanting to hurt yourself or someone else   Pain in chest, obstructed breathing or shortness of breath (trouble catching your breath) may mean you are having a postpartum complication. Call your provider immediately   Headache that does not get better, even after taking medicine, a bad headache with vision changes or pain in the upper right area of your belly may mean you have high blood pressure or post birth preeclampsia. Call your provider immediately    HAND WASHING  All family and friends should wash their hands:  Before and after holding the baby  Before feeding the baby  After using the restroom or changing the baby's diaper    WOUND CARE  Ask your physician for additional care instructions. In general:  Episiotomy/Laceration  May use cathleen-spray bottle, witch hazel pads and dermaplast spray for comfort  Use cathleen-spray bottle after urinating to cleanse perineal area  To prevent burning during urination spray cathleen-water bottle on labial area   Pat perineal area dry until episiotomy/laceration is healed  Continue to use cathleen-bottle until bleeding stops as  needed  If have a 2nd degree laceration or greater, a Sitz bath can offer relief from soreness, burning, and inflammation   Sitz Bath   Sit in 6 inches of warm water and soak laceration as needed until the laceration heals    VAGINAL CARE AND BLEEDING  Nothing inside vagina for 6 weeks:   No sexual intercourse, tampons or douching  Bleeding may continue for 2-4 weeks. Amount and color may vary  Soaking 1 pad or more in an hour for several hours is considered heavy bleeding  Passing large egg sized blood clots can be concerning  If you feel like you have heavy bleeding or are having increasing amount of blood clots call your Obstetrician immediately  If you begin feeling faint upon standing, feeling sick to your stomach, have clammy skin, a really fast heartbeat, have chills, start feeling confused, dizzy, sleepy or weak, or feeling like you're going to faint call your Obstetrician immediately    HYPERTENSION   Preeclampsia or gestational hypertension are types of high blood pressure that only pregnant women can get. It is important for you to be aware of symptoms to seek early intervention and treatment. If you have any of these symptoms immediately call your Obstetrician    Vision changes or blurred vision   Severe headache or pain that is unrelieved with medication and will not go away  Persistent pain in upper abdomen or shoulder   Increased swelling of face, feet, or hands  Difficulty breathing or shortness of breath at rest  Urinating less than usual    URINATION AND BOWEL MOVEMENTS  Eating more fiber (bran cereal, fruits, and vegetables) and drinking plenty of fluids will help to avoid constipation  Urinary frequency and urgency after childbirth is normal  If you experience any urinary pain, burning or frequency call your provider    BIRTH CONTROL  It is possible to become pregnant at any time after delivery and while breastfeeding  Plan to discuss a method of birth control with your physician at your post  "delivery follow up visit    POSTPARTUM BLUES  During the first few days after birth, you may experience a sense of the \"blues\" which may include impatience, irritability or even crying. These feelings come and go quickly. However, as many as 1 in 10 women experience emotional symptoms known as postpartum depression.     POSTPARTUM DEPRESSION    May start as early as the second or third day after delivery or take several weeks or months to develop. Symptoms of \"blues\" are present, but are more intense: Crying spells; loss of appetite; feelings of hopelessness or loss of control; fear of touching the baby; over concern or no concern at all about the baby; little or no concern about your own appearance/caring for yourself; and/or inability to sleep or excessive sleeping. Contact your Obstetrician if you are experiencing any of these symptoms     PREVENTING SHAKEN BABY  If you are angry or stressed, PUT THE BABY IN THE CRIB, step away, take some deep breaths, and wait until you are calm to care for the baby. DO NOT SHAKE THE BABY. You are not alone, call a supporter for help.  [facility] 24/7 crisis call line ([phone]) or (1-238.674.2617)  You can also text them, text \"ANSWER\" (345024)  "

## 2024-05-02 NOTE — LACTATION NOTE
Follow up lactation visit:    Infant in nursery receiving circumcision at this time. Parents encouraged to call for LC assistance when infant has returned to room.    1030-Infant returned to room post-circumcision. He is very sleepy and spitty. Jitendra is encouraged to place John skin-to-skin and offer the breast as soon as he begins to show hunger cues. She is encouraged to call for latch assist, as desired. She states that she is planning to continue combination feeding, and is encouraged to place baby to breast at the start of each feeding, prior to offering supplemental formula. If she desires to maximize her milk supply, she is also encouraged to pump her breasts with her double electric pump when baby is receiving supplemental bottles.    Feeding plan:    Cue-based infant feeding, at least once every three hours, for a total of 8+ feeds per 24 hours. If unable to achieve optimal latch, follow feed at breast with age-appropriate volumes of supplemental formula. Pump breasts after bottle feeds, if maximizing milk production is desired.    Jitendra is provided with the opportunity to ask questions. These have been answered to her satisfaction. She is encouraged to call RN/lactation for additional breastfeeding assistance, as needed, throughout remainder of hospital stay.       Encouraged follow up with [facility] for outpatient lactation support (referral sent).   [facility] Breastfeeding Resource list provided.

## 2024-05-02 NOTE — PROGRESS NOTES
Assessment done vital signs stable. Patient progressing according to plan of care. Fundus firm with light lochia. Patient up voiding without difficulty. Ambulating with steady gait. Claims to have good pain relief with p.o medications. Breast feeding infant on demand. Family at bedside. Patient denies problems at this time. Patient encouraged to call for any needs. Will continue to monitor.

## 2024-05-02 NOTE — CARE PLAN
The patient is Stable - Low risk of patient condition declining or worsening    Shift Goals  Clinical Goals: VSS, lochia WDL, pain WDL  Patient Goals:   Family Goals:     Progress made toward(s) clinical / shift goals:  VSS, lochia scant to light, pain controlled with PRN medications and rest. Safe to discharge home.     Problem: Knowledge Deficit - Postpartum  Goal: Patient will verbalize and demonstrate understanding of self and infant care  Outcome: Met       Patient is not progressing towards the following goals:

## 2024-05-02 NOTE — DISCHARGE PLANNING
Discharge Planning Assessment Post Partum    Reason for Referral: History of anxiety   Address: [address] [place] [address] [place] [address]  Phone: [phone]  Type of Living Situation: stable housing   Mom Diagnosis: Pregnancy, vaginal delivery   Baby Diagnosis: -39.5 weeks   Primary Language: English     Name of Baby: John Coreas (: 24)  Father of the Baby: Fady Coreas   Involved in baby’s care? Yes  Contact Information: [phone]    Prenatal Care:  Yes-Dr. Vela   Mom's PCP: No PCP listed   PCP for new baby: Yes    Support System: FOB and family   Coping/Bonding between mother & baby: Yes  Source of Feeding: breast feeding   Supplies for Infant: prepared for infant; denies any needs    Mom's Insurance: [employer] and [employer]   Baby Covered on Insurance: Yes  Mother Employed/School: [employer]   Other children in the home/names & ages: history of 15 week fetal demise in     Financial Hardship/Income: No   Mom's Mental status: alert and oriented   Services used prior to admit: None     CPS History: No  Psychiatric History: history of anxiety.  Discussed with Pt and provided PPD resources.  Domestic Violence History: No  Drug/ETOH History: No    Resources Provided: post partum support and counseling resources provided   Referrals Made: None      Clearance for Discharge: Infant is cleared to discharge home with parents once medically cleared

## 2024-05-02 NOTE — DISCHARGE SUMMARY
Discharge Summary:      Jitendra Banks Mecum    Admit Date:   2024  Discharge Date:  2024     Admitting diagnosis:  Labor and delivery, indication for care [O75.9]  Discharge Diagnosis: Status post vaginal, spontaneous.  Pregnancy Complications:     DIAGNOSES:    1.  A 39 and 5/7th week gestation, delivered.  2.  Induced labor    PROCEDURES:    1.  Induction of labor.  2.  Epidural anesthesia.  3.  Spontaneous vaginal delivery.  4.  Repair of second-degree perineal laceration.     .        History:  Past Medical History:   Diagnosis Date    Migraine     OM (otitis media)      OB History    Para Term  AB Living   3 3 2     2   SAB IAB Ectopic Molar Multiple Live Births           0 2      # Outcome Date GA Lbr Win/2nd Weight Sex Delivery Anes PTL Lv   3 Term 24 39w5d 25:33 / 02:55 4.115 kg (9 lb 1.2 oz) M Vag-Spont EPI N JOSE   2 Para 23 15w6d    Vag-Spont EPI  FD   1 Term     F    JOSE        Patient has no known allergies.  Patient Active Problem List    Diagnosis Date Noted    Routine general medical examination at a [facility] facility 2019    Screening for endocrine, metabolic and immunity disorder 2019    Migraine without aura and without status migrainosus, not intractable 2019        Hospital Course:   29 y.o. , now para 2, was admitted with the above mentioned diagnosis, underwent Induction of Labor, patient progressed to complete and had a vaginal, spontaneous delivery. Patient postpartum course was unremarkable, with progressive advancement in diet , ambulation and toleration of oral analgesia. Patient without complaints today and desires discharge.      Vitals:    24 1315 24 1800 24 0035 24 0625   BP: (!) 137/90 (!) 146/79 111/71 124/76   Pulse: (!) 111 (!) 108 95 96   Resp: 20 16 15 16   Temp: 37 °C (98.6 °F) 36.9 °C (98.5 °F) 36.9 °C (98.4 °F) 36.2 °C (97.1 °F)   TempSrc: Temporal Temporal Temporal Temporal   SpO2: 98% 99%  97% 96%   Weight:       Height:           Current Facility-Administered Medications   Medication Dose    ondansetron (Zofran) syringe/vial injection 4 mg  4 mg    D5LR infusion      lactated ringers infusion      docusate sodium (Colace) capsule 100 mg  100 mg    ibuprofen (Motrin) tablet 800 mg  800 mg    acetaminophen (Tylenol) tablet 1,000 mg  1,000 mg    PRN oxytocin (PITOCIN) (20 Units/1000 mL) PRN for excessive uterine bleeding - See Admin Instr  125-999 mL/hr    methylergonovine (Methergine) injection 0.2 mg  0.2 mg    magnesium hydroxide (Milk Of Magnesia) suspension 30 mL  30 mL    oxyCODONE immediate-release (Roxicodone) tablet 5 mg  5 mg    prenatal plus vitamin (Stuartnatal 1+1) 27-1 MG tablet 1 Tablet  1 Tablet    simethicone (Mylicon) chewable tablet 125 mg  125 mg    calcium carbonate (Tums) chewable tab 1,000 mg  1,000 mg    LR infusion      oxytocin (Pitocin) infusion (for post delivery)  125 mL/hr    oxytocin (Pitocin) infusion (for induction)  0.5-20 esther-units/min    ropivacaine 0.2 % (Naropin) injection         Exam:  Gen: NAD  Abdomen: Abdomen soft, non-tender. BS normal. No masses,  No organomegaly  Fundus Non Tender: no  Extremity: extremities, peripheral pulses and reflexes normal     Labs:  Recent Labs     04/30/24  0435 05/01/24 2011   WBC 10.8 15.1*   RBC 4.22 3.93*   HEMOGLOBIN 13.7 12.5   HEMATOCRIT 39.7 38.0   MCV 94.1 96.7   MCH 32.5 31.8   MCHC 34.5 32.9   RDW 50.8* 53.6*   PLATELETCT 211 162*   MPV 10.5 11.3        Activity:   Discharge to home  Pelvic Rest x 6 weeks    Assessment:  normal postpartum course  Discharge Assessment: No heavy bleeding or foul vaginal discharge      Follow up: 6 weeks with Dr Vela      Discharge Meds:   No current outpatient medications on file.   OTC ibuprofen/tylenol    Pat Villanueva M.D.

## 2024-05-02 NOTE — CARE PLAN
The patient is Stable - Low risk of patient condition declining or worsening    Shift Goals  Clinical Goals: VS WDL  Patient Goals:   Family Goals: emotional support    Progress made toward(s) clinical / shift goals:    Problem: Knowledge Deficit - Postpartum  Goal: Patient will verbalize and demonstrate understanding of self and infant care  Outcome: Progressing     Problem: Psychosocial - Postpartum  Goal: Patient will verbalize and demonstrate effective bonding and parenting behavior  Outcome: Progressing     Problem: Altered Physiologic Condition  Goal: Patient physiologically stable as evidenced by normal lochia, palpable uterine involution and vitals within normal limits  Outcome: Progressing     Problem: Infection - Postpartum  Goal: Postpartum patient will be free of signs and symptoms of infection  Outcome: Progressing       Patient is not progressing towards the following goals:

## 2024-10-08 ENCOUNTER — OFFICE VISIT (OUTPATIENT)
Dept: NEUROLOGY | Facility: MEDICAL CENTER | Age: 29
End: 2024-10-08
Payer: COMMERCIAL

## 2024-10-08 VITALS
BODY MASS INDEX: 28.91 KG/M2 | DIASTOLIC BLOOD PRESSURE: 60 MMHG | RESPIRATION RATE: 16 BRPM | OXYGEN SATURATION: 97 % | SYSTOLIC BLOOD PRESSURE: 112 MMHG | HEIGHT: 64 IN | TEMPERATURE: 97.2 F | HEART RATE: 89 BPM | WEIGHT: 169.31 LBS

## 2024-10-08 DIAGNOSIS — G43.109 MIGRAINE WITH AURA AND WITHOUT STATUS MIGRAINOSUS, NOT INTRACTABLE: ICD-10-CM

## 2024-10-08 PROCEDURE — 3074F SYST BP LT 130 MM HG: CPT

## 2024-10-08 PROCEDURE — 99204 OFFICE O/P NEW MOD 45 MIN: CPT

## 2024-10-08 PROCEDURE — 3078F DIAST BP <80 MM HG: CPT

## 2024-10-08 PROCEDURE — 99211 OFF/OP EST MAY X REQ PHY/QHP: CPT

## 2024-10-08 RX ORDER — ONDANSETRON 4 MG/1
4 TABLET, ORALLY DISINTEGRATING ORAL EVERY 6 HOURS PRN
Qty: 30 TABLET | Refills: 2 | Status: SHIPPED | OUTPATIENT
Start: 2024-10-08

## 2024-10-08 ASSESSMENT — FIBROSIS 4 INDEX: FIB4 SCORE: 0.76

## 2024-10-08 ASSESSMENT — PATIENT HEALTH QUESTIONNAIRE - PHQ9: CLINICAL INTERPRETATION OF PHQ2 SCORE: 0

## 2024-12-05 ENCOUNTER — HOSPITAL ENCOUNTER (OUTPATIENT)
Dept: RADIOLOGY | Facility: MEDICAL CENTER | Age: 29
End: 2024-12-05
Payer: COMMERCIAL

## 2024-12-05 DIAGNOSIS — G43.109 MIGRAINE WITH AURA AND WITHOUT STATUS MIGRAINOSUS, NOT INTRACTABLE: ICD-10-CM

## 2024-12-05 PROCEDURE — A9579 GAD-BASE MR CONTRAST NOS,1ML: HCPCS | Mod: JZ

## 2024-12-05 PROCEDURE — 700117 HCHG RX CONTRAST REV CODE 255: Mod: JZ

## 2024-12-05 PROCEDURE — 70553 MRI BRAIN STEM W/O & W/DYE: CPT

## 2024-12-05 RX ADMIN — GADOTERIDOL 15 ML: 279.3 INJECTION, SOLUTION INTRAVENOUS at 15:22

## 2025-01-07 NOTE — PROGRESS NOTES
[facility]  GENERAL NEUROLOGY  FOLLOW UP VISIT    HISTORY OF ILLNESS:  Jitendra Coreas is a 29 y.o. woman with a history most notable for migraine.  She is here for follow-up to discuss her use of Nerivio and MRI and its effectiveness on her migraines.  Today, Jitendra provided the following  interm history:    1/15/25- She is still having the migraines once a month. She is using a dual action tylenol/ ibuprofen and it is very effective at aborting her migraine. She tried nerivio but didn't see any significant improvement.     Below information was gathered at previous appointment  Migraine description:  Occasionally will have black spots in left eye. She reports about an hour before migraine will start. Migraine will start behind bilateral eyes and will radiate across head and into her neck. Quality of migraine is described as sharp, pulsating, and pressure. Intensity of migraine without medication 8-9/10. Relpax so long can't remember, maxalt so long can't remember, nurtec so long can't remember, Tylenol 8-9/10. Migraines can last for 3 hours to 1.5 days. Post migraine hangover of malaise for half a day. Frequency of  migraine is once a week. Associated symptoms: light sensitivity, noise sensitivity, smell sensitivity, temperatures sensitivity, nausea, vomiting, blurred vision, tunnel vision, dizziness, tinnitus, neck tension. Triggers: stress, hormones, sleep deprivation, strong perfume/cologne, bright lights, red sauce,  LED lights. Denies being able to trigger a migraine with sudden position change, bending, position change, laughing, sneezing or coughing. Can trigger with crying. Gets a tingling sensation with prolonged laughing or smiling on her head. Denies double vision. Migraine usually occurs in the morning escalating to about noon before full migraine. She denies snoring. She states she feels well rested after a full night sleep. No daytime sleepiness. Denies waking herself up gasping or startling  herself awake.     The following is a summary of headache symptoms, presented in my standard format:    Family History: mom, aunt, grandmother, cousins  Age at onset (years): 18 years old  Location: see above   Radiation: see above   Frequency: see above   Duration: see above   Headache Days/Month: June 1-2/30, July 1-2/30, August 3/30, September 3/30, October 1/30  Quality: see above   Intensity: see above   Aura: see above   Photophobia/Phonophobia/Nausea/Vomiting: yes, yes, yes, yes  Provoked by Physical Activity?: no  Triggers: see above   Associated Symptoms: see above   Autonomic Signs (such as ptosis, miosis, conjunctival injection, rhinorrhea, increased lacrimation): no  Head Trauma: 1 concussion as child  Association with Menses: yes prior  ED Visits:yes  Hospitalizations: no  Missed Work Days ( services): 3-4 days a month  Sleep (hours/night): 5 hours   Caffeine Intake: 1 energy drinks a day  Hydration: yes  Nutrition: does not skip meals  Exercise: yes- does not trigger migraine  Analgesic Overuse: Tylenol 1,000 mg once a week    Current Medication Regimen:  -Nerivio    Medications Tried: Response  Preventive:  - indomethacin   - ajovy  - emgality   - amitriptyline  - zonisamide  - nortriptyline    Rescue:  - rizatriptan  -  eletriptan  - nurtec    Medications Not Tried:  -     MEDICAL AND SURGICAL HISTORY:  Past Medical History:   Diagnosis Date    Migraine     OM (otitis media)      Past Surgical History:   Procedure Laterality Date    TYMPANOPLASTY  1/18/2011    Performed by TAVO MATTHEW at SURGERY SAME DAY [facility]    MYRINGOTOMY  1997    [facility]     MEDICATIONS:  Current Outpatient Medications   Medication Sig    ondansetron (ZOFRAN ODT) 4 MG TABLET DISPERSIBLE Take 1 Tablet by mouth every 6 hours as needed (migraine associated nausea).     SOCIAL HISTORY:  Social History     Tobacco Use    Smoking status: Never    Smokeless tobacco: Never   Substance Use Topics    Alcohol use: No  "    Social History     Social History Narrative    Not on file     FAMILY HISTORY:  Family History   Problem Relation Age of Onset    Breast Cancer Maternal Grandmother 40    Breast Cancer Paternal Grandmother 40    Ovarian Cancer Paternal Aunt 42       Ambulatory Vitals  Encounter Vitals  Temperature: 37 °C (98.6 °F)  Temp src: Temporal  Blood Pressure: 108/58  Pulse: 90  Respiration: 12  Pulse Oximetry: 97 %  Weight: 79.7 kg (175 lb 11.3 oz)  Height: 162.6 cm (5' 4\") (pt reported)  BMI (Calculated): 30.16     REVIEW OF SYSTEMS:  A ROS was completed.  Pertinent positives and negatives were included in the HPI, above.  All other systems were reviewed and are negative.    PHYSICAL EXAM:  General/Medical:  Jitendra presents clean and well-dressed.  She has no malar rash.  She does not appear in any acute distress.  She reports no jaw claudication or jaw pain.  She reports no allodynia.  She does report neck tension associated with her migraines.  She does have good range of motion of her neck.  She has no upper or lower extremity edema.  Vital signs are listed above and are within normal limits.    Neuro:  MENTAL STATUS: awake and alert; no deficits of speech or language; oriented to person, place, and time; affect was appropriate to situation    CRANIAL NERVES:    II: acuity: JNT/JNT, fields: intact to confrontation, pupils: 3/3 to 2/2 without a relative afferent pupillary defect, discs:NT    III/IV/VI: versions: intact without nystagmus    V: facial sensation: :NT    VII: facial expression: symmetric    VIII: hearing: intact to finger rub    IX/X: palate: elevates symmetrically    XI: shoulder shrug: symmetric    XII: tongue: midline    MOTOR:  - bulk: NT  - tone: NT  Upper Extremity Strength  (R/L)    NT   Elbow flexion NT   Elbow extension NT   Shoulder abduction NT     Lower Extremity Strength  (R/L)   Hip flexion NT   Knee extension NT   Knee flexion NT   Ankle plantarflexion NT   Ankle dorsiflexion NT     - " pronator drift: NT  - abnormal movements: NT    SENSATION:  - light touch: NT  - vibration: Intact  - temperature: NT  - pinprick: NT  - proprioception: NT  - Romberg: NT    COORDINATION:  - finger to nose: NT  - finger tapping: NT  - foot tapping: NT  - foot inversion/ eversion: NT  - clonus: NT    REFLEXES:  Reflex Right Left   BR NT NT   Biceps NT NT   Triceps NT NT   Patellae NT NT   Achilles NT NT   Toes NT NT     GAIT:  - narrow base and normal with normal arm swing  - heel-walk: NT  - toe-walk:NT  - tandem gait: NT    REVIEW OF IMAGING STUDIES: I reviewed the following studies:  MRI Brain:  Date: 12/5/24  W/o and w/ contrast?: with and without  Indication: change in migraines  Comparison: none  Impression:  1.  Distended cavum septum pellucidum and cavum vergae suggesting the possibility of arachnoid cyst formation.  2.  Metallic susceptibility artifact obscuring the frontal and temporal lobes in multiple sequences.  3.  Otherwise unremarkable MRI scan with and without gadolinium enhancement.    REVIEW OF LABORATORY STUDIES:      ASSESSMENT& PLAN:  1. Migraine with aura and without status migrainosus, not intractable  Lianna presents for follow-up to discuss her current medication regimen of Nerivio and its effect on her migraines.  Currently she is still down to 1 or 2 migraines a month and is usually around her menstrual cycle.  She did try Nerivio but did not feel that it was effective at aborting her migraine.  She did try dual action ibuprofen/Tylenol and it does seem to be aborting her migraine down to a 0/10 without any residual effects.  She is no longer breast-feeding.  We did discuss a migraine medication for her migraines.  She would like to stick with her dual action over-the-counter for now.  She can contact me via [employer] if she has any changes in her migraine frequency or severity or if she would like to try medication.  Her recent MRI did show possible arachnoid cyst however there was a lot of  interference from her braces.  We did discuss a repeat MRI of her brain in 6 months to a year to check for any changes to her cyst.  She is agreeable to a repeat brain MRI in a year.  Since we are not making any changes to her current medication regimen she can follow-up with me in a year for full neurological exam and to discuss results.  - MR-BRAIN-WITH & W/O; Future        BILLING DOCUMENTATION:   I spent 10 minutes in patient care. This includes time with chart review, pre-charting, records review, discussions with other healthcare providers, and documentation. This also includes face-to-face time with the patient for: exam review, discussion and treatment planning.      Georgiana Mak MSN APRN-CNP  [facility] [place]

## 2025-01-08 ENCOUNTER — TELEPHONE (OUTPATIENT)
Dept: NEUROLOGY | Facility: MEDICAL CENTER | Age: 30
End: 2025-01-08
Payer: COMMERCIAL

## 2025-01-08 NOTE — TELEPHONE ENCOUNTER
NEUROLOGY PATIENT PRE-VISIT PLANNING     Patient was NOT contacted to complete PVP.  Note: Patient will not be contacted if there is no indication to call.     Patient Appointment is scheduled as: Established Patient     Is visit type and length scheduled correctly? Yes    [employer] Patient is checked in Patient Demographics? Yes    3.   Is referral attached to visit? Yes    4. Were records received from referring provider? Yes    4. Patient was NOT contacted to have someone accompany them to visit.     5. Is this appointment scheduled as a Hospital Follow-Up?  No    6. Does the patient require any pre procedure or post procedure follow up? Yes    7. If any orders were placed at last visit or intended to be done for this visit do we have Results/Consult Notes? Yes  Labs - Labs were not ordered at last office visit.  Imaging - Imaging ordered, completed and results are in chart.  Referrals - No referrals were ordered at last office visit.  Note: If patient appointment is for lab or imaging review and patient did not complete the studies, check with provider if OK to reschedule patient until completed.    8. If patient appointment is for Botox - is order pended for provider? N/A    9. Was Plan Assessment from last Neurology Office Visit Reviewed?  Yes

## 2025-01-15 ENCOUNTER — OFFICE VISIT (OUTPATIENT)
Dept: NEUROLOGY | Facility: MEDICAL CENTER | Age: 30
End: 2025-01-15
Payer: COMMERCIAL

## 2025-01-15 VITALS
HEIGHT: 64 IN | OXYGEN SATURATION: 97 % | HEART RATE: 90 BPM | BODY MASS INDEX: 30 KG/M2 | DIASTOLIC BLOOD PRESSURE: 58 MMHG | TEMPERATURE: 98.6 F | RESPIRATION RATE: 12 BRPM | SYSTOLIC BLOOD PRESSURE: 108 MMHG | WEIGHT: 175.71 LBS

## 2025-01-15 DIAGNOSIS — G43.109 MIGRAINE WITH AURA AND WITHOUT STATUS MIGRAINOSUS, NOT INTRACTABLE: ICD-10-CM

## 2025-01-15 PROCEDURE — 3074F SYST BP LT 130 MM HG: CPT

## 2025-01-15 PROCEDURE — 3078F DIAST BP <80 MM HG: CPT

## 2025-01-15 PROCEDURE — 99212 OFFICE O/P EST SF 10 MIN: CPT

## 2025-01-15 PROCEDURE — 99211 OFF/OP EST MAY X REQ PHY/QHP: CPT

## 2025-01-15 ASSESSMENT — FIBROSIS 4 INDEX: FIB4 SCORE: 0.79

## 2025-01-15 ASSESSMENT — PATIENT HEALTH QUESTIONNAIRE - PHQ9: CLINICAL INTERPRETATION OF PHQ2 SCORE: 0

## 2025-04-09 ENCOUNTER — TELEPHONE (OUTPATIENT)
Dept: PHARMACY | Facility: MEDICAL CENTER | Age: 30
End: 2025-04-09
Payer: COMMERCIAL

## 2025-04-09 DIAGNOSIS — G43.109 MIGRAINE WITH AURA AND WITHOUT STATUS MIGRAINOSUS, NOT INTRACTABLE: ICD-10-CM

## 2025-04-09 RX ORDER — UBROGEPANT 100 MG/1
1 TABLET ORAL PRN
Qty: 10 TABLET | Refills: 7 | Status: SHIPPED | OUTPATIENT
Start: 2025-04-09

## 2025-04-09 NOTE — TELEPHONE ENCOUNTER
Received New Start request via [employer]  for Ubrogepant (UBRELVY) 100 MG Tab. (Quantity:10, Day Supply:30)     Insurance: [employer] ADV  Member ID:  [id number]  BIN: [id number]  PCN: [id number]  Group: [id number]     Ran Test claim via [employer] & medication Rejects stating prior authorization is required.

## 2025-04-11 NOTE — TELEPHONE ENCOUNTER
Prior Authorization for Ubrogepant (UBRELVY) 100 MG Tab  (Quantity: 10 , Days: 30) has been submitted via Cover My Meds: Key ([id number])    Insurance: [facility] ADV    Will follow up in 24-48 business hours.

## 2025-04-14 NOTE — TELEPHONE ENCOUNTER
Prior Authorization for Ubrogepant (UBRELVY) 100 MG Tab  has been approved for a quantity of 10 , day supply 30    Prior Authorization reference number: [id number]  Insurance: "[employer] [facility]  Effective dates: 4/11/25 to 4/11/26  Copay: $0      Is patient eligible to fill with [facility]? Yes    Next Steps: The Patients copay is less than $5.00. Will contact the patient to determine choice of pharmacy, if applicable.

## 2025-05-30 ENCOUNTER — OFFICE VISIT (OUTPATIENT)
Dept: URGENT CARE | Facility: PHYSICIAN GROUP | Age: 30
End: 2025-05-30
Payer: COMMERCIAL

## 2025-05-30 VITALS
RESPIRATION RATE: 12 BRPM | WEIGHT: 175 LBS | BODY MASS INDEX: 29.88 KG/M2 | HEIGHT: 64 IN | HEART RATE: 104 BPM | TEMPERATURE: 97.8 F | OXYGEN SATURATION: 97 %

## 2025-05-30 DIAGNOSIS — R68.89 FLU-LIKE SYMPTOMS: ICD-10-CM

## 2025-05-30 DIAGNOSIS — J10.1 INFLUENZA B: Primary | ICD-10-CM

## 2025-05-30 LAB
FLUAV RNA SPEC QL NAA+PROBE: NEGATIVE
FLUBV RNA SPEC QL NAA+PROBE: POSITIVE
RSV RNA SPEC QL NAA+PROBE: NEGATIVE
S PYO DNA SPEC NAA+PROBE: NOT DETECTED
SARS-COV-2 RNA RESP QL NAA+PROBE: NEGATIVE

## 2025-05-30 RX ORDER — OSELTAMIVIR PHOSPHATE 75 MG/1
75 CAPSULE ORAL 2 TIMES DAILY
Qty: 10 CAPSULE | Refills: 0 | Status: SHIPPED | OUTPATIENT
Start: 2025-05-30 | End: 2025-06-04

## 2025-05-30 ASSESSMENT — ENCOUNTER SYMPTOMS
ABDOMINAL PAIN: 0
MYALGIAS: 1
SORE THROAT: 1
FEVER: 0
SHORTNESS OF BREATH: 0
CHILLS: 1
COUGH: 1

## 2025-05-30 ASSESSMENT — FIBROSIS 4 INDEX: FIB4 SCORE: 0.79

## 2025-05-30 NOTE — PROGRESS NOTES
"Subjective:   Chief Complaint  Jitendra Coreas is a 30 y.o. female who presents for Illness (X2days chills, bodyaches, cough, sore throat )      History of Present Illness  Patient presents with complaints of bodyaches, chills, sore throat and cough that started in these past 2 days.  She states that her son was recently admitted to the hospital for hypoxia and respiratory failure.  She states that he was recently discharged but she was in the hospital for an extended period of time and shortly after developed symptoms.  She states that she has had a temperature with a Tmax of 99.2.  She states that she has not tried any OTC medications for this.        Review of Systems  Review of Systems   Constitutional:  Positive for chills and malaise/fatigue. Negative for fever.   HENT:  Positive for congestion and sore throat. Negative for ear pain.    Respiratory:  Positive for cough. Negative for shortness of breath.    Cardiovascular:  Negative for chest pain.   Gastrointestinal:  Negative for abdominal pain.   Musculoskeletal:  Positive for myalgias.   Skin:  Negative for rash.       Past Medical History  Past Medical History[1]    Family History  Family History   Problem Relation Age of Onset    Breast Cancer Maternal Grandmother 40    Breast Cancer Paternal Grandmother 40    Ovarian Cancer Paternal Aunt 42       Social History  Social History[2]    Surgical History  Past Surgical History[3]    Current Medications  Home Medications       Reviewed by Rebecca Matta (Medical Assistant) on 05/30/25 at 0830  Med List Status: <None>     Medication Last Dose Status   ondansetron (ZOFRAN ODT) 4 MG TABLET DISPERSIBLE PRN Active   Ubrogepant (UBRELVY) 100 MG Tab Taking Active                    Allergies  Allergies[4]       Objective:     Pulse (!) 104   Temp 36.6 °C (97.8 °F) (Temporal)   Resp 12   Ht 1.626 m (5' 4\")   Wt 79.4 kg (175 lb)   SpO2 97%     Physical Exam  Constitutional:       Appearance: " Normal appearance. She is normal weight.   HENT:      Head: Normocephalic and atraumatic.      Right Ear: Tympanic membrane, ear canal and external ear normal.      Left Ear: Tympanic membrane, ear canal and external ear normal.      Nose: Nose normal.      Mouth/Throat:      Mouth: Mucous membranes are moist.      Pharynx: Oropharynx is clear. Posterior oropharyngeal erythema present.   Eyes:      Conjunctiva/sclera: Conjunctivae normal.      Pupils: Pupils are equal, round, and reactive to light.   Cardiovascular:      Rate and Rhythm: Normal rate and regular rhythm.      Pulses: Normal pulses.      Heart sounds: Normal heart sounds.   Pulmonary:      Effort: Pulmonary effort is normal.      Breath sounds: Normal breath sounds.   Abdominal:      General: Abdomen is flat. Bowel sounds are normal.      Palpations: Abdomen is soft.   Skin:     General: Skin is warm and dry.      Capillary Refill: Capillary refill takes less than 2 seconds.   Neurological:      General: No focal deficit present.      Mental Status: She is alert and oriented to person, place, and time. Mental status is at baseline.   Psychiatric:         Mood and Affect: Mood normal.         Behavior: Behavior normal.         Thought Content: Thought content normal.         Judgment: Judgment normal.       Results for orders placed or performed in visit on 05/30/25   POCT CoV-2, Flu A/B, RSV by PCR    Collection Time: 05/30/25  9:00 AM   Result Value Ref Range    SARS-CoV-2 by PCR Negative Negative, Invalid    Influenza virus A RNA Negative Negative, Invalid    Influenza virus B, PCR Positive (A) Negative, Invalid    RSV, PCR Negative Negative, Invalid   POCT GROUP A STREP, PCR    Collection Time: 05/30/25  9:00 AM   Result Value Ref Range    POC Group A Strep, PCR Not Detected Not Detected, Invalid         Assessment/Plan:     Diagnosis  1. Flu-like symptoms  - POCT CoV-2, Flu A/B, RSV by PCR  - POCT GROUP A STREP, PCR        MDM/Plan/Discussion  Get  plenty of rest.  Drink lots of fluids (water, juice, or broth) to stay hydrated. This helps replace any fluids lost if you have a runny nose or sweating from a fever. Warm tea or soup can help soothe a sore throat.  Use a cool mist humidifier to add moisture to the air, if it helps.  Use saline nose drops or spray to relieve stuffiness.  Recommend over-the-counter cold and flu medications and Tylenol and/or ibuprofen for symptomatic relief and fevers.         Shared decision-making was utilized with patient for treatment plan. Medication discussed included indication for use and the potential benefits and side effects. Education was provided regarding the aforementioned assessments.  Differential Diagnosis, natural history, and supportive care discussed. All of the patient's questions were answered to their satisfaction at the time of discharge. Patient was encouraged to monitor symptoms closely. Those signs and symptoms which would warrant concern and mandate seeking a higher level of service through the emergency department discussed at length.  Patient stated agreement and understanding of this plan of care.    Advised the patient to follow-up with the primary care physician for recheck, reevaluation, and consideration of further management.    Please note that this dictation was created using voice recognition software. I have made a reasonable attempt to correct obvious errors, but I expect that there are errors of grammar and possibly content that I did not discover before finalizing the note.    This note was electronically signed by DAGOBERTO Brown         [1]   Past Medical History:  Diagnosis Date    Migraine     OM (otitis media)    [2]   Social History  Socioeconomic History    Marital status:    Tobacco Use    Smoking status: Never    Smokeless tobacco: Never   Vaping Use    Vaping status: Never Used   Substance and Sexual Activity    Alcohol use: No    Drug use: No   [3]   Past Surgical  History:  Procedure Laterality Date    TYMPANOPLASTY  1/18/2011    Performed by TAVO MATTHEW at SURGERY SAME DAY [facility]    MYRINGOTOMY  1997    ANDERSON E[facility]   [4] No Known Allergies

## 2025-06-16 ENCOUNTER — HOSPITAL ENCOUNTER (OUTPATIENT)
Facility: MEDICAL CENTER | Age: 30
End: 2025-06-16
Attending: OBSTETRICS & GYNECOLOGY
Payer: COMMERCIAL

## 2025-06-16 PROCEDURE — 88142 CYTOPATH C/V THIN LAYER: CPT

## 2025-06-16 PROCEDURE — 87624 HPV HI-RISK TYP POOLED RSLT: CPT

## 2025-06-24 LAB — THINPREP PAP, CYTOLOGY NL11781: NORMAL

## 2025-06-26 LAB
HPV I/H RISK 1 DNA SPEC QL NAA+PROBE: NOT DETECTED
SPECIMEN SOURCE: NORMAL